# Patient Record
Sex: MALE | Race: WHITE | Employment: FULL TIME | URBAN - METROPOLITAN AREA
[De-identification: names, ages, dates, MRNs, and addresses within clinical notes are randomized per-mention and may not be internally consistent; named-entity substitution may affect disease eponyms.]

---

## 2017-12-05 ENCOUNTER — OFFICE VISIT (OUTPATIENT)
Dept: FAMILY MEDICINE CLINIC | Age: 57
End: 2017-12-05

## 2017-12-05 VITALS
OXYGEN SATURATION: 97 % | BODY MASS INDEX: 23.78 KG/M2 | SYSTOLIC BLOOD PRESSURE: 128 MMHG | WEIGHT: 189 LBS | HEART RATE: 52 BPM | TEMPERATURE: 98.5 F | DIASTOLIC BLOOD PRESSURE: 82 MMHG

## 2017-12-05 DIAGNOSIS — N52.9 ERECTILE DYSFUNCTION, UNSPECIFIED ERECTILE DYSFUNCTION TYPE: Primary | ICD-10-CM

## 2017-12-05 PROCEDURE — 99213 OFFICE O/P EST LOW 20 MIN: CPT | Performed by: NURSE PRACTITIONER

## 2017-12-05 RX ORDER — SILDENAFIL 100 MG/1
100 TABLET, FILM COATED ORAL PRN
Qty: 6 TABLET | Refills: 3 | Status: SHIPPED | OUTPATIENT
Start: 2017-12-05 | End: 2018-05-29 | Stop reason: SDUPTHER

## 2017-12-05 ASSESSMENT — ENCOUNTER SYMPTOMS: SHORTNESS OF BREATH: 0

## 2017-12-05 NOTE — PROGRESS NOTES
Patient: Krystle Gutierrez is a 56 y.o. male who presents today with the following Chief Complaint(s):  Chief Complaint   Patient presents with    Erectile Dysfunction         HPI   Patient is a 57 yo male who is here to discuss ED. Has been off lexapro and xanax for awhile. Takes benedryl at night to help with sleep. Recently started having ED. Went through stressful divorce in September. Started seeing a new woman and has tried to have sex a couple times and could not get an erection. He feels like the chemistry is there with this woman. Never had a problem with getting an erection with his ex-wife although he states they only had sex a couple times a year. Current Outpatient Prescriptions   Medication Sig Dispense Refill    DiphenhydrAMINE HCl (BENADRYL PO) Take 25 mg by mouth Take 1 -1.5 tabs at night      sildenafil (VIAGRA) 100 MG tablet Take 1 tablet by mouth as needed for Erectile Dysfunction 6 tablet 3     No current facility-administered medications for this visit. Patient's past medical history, surgical history, family history, medications,  and allergies  were all reviewed and updated as appropriate today. Review of Systems   Respiratory: Negative for shortness of breath. Cardiovascular: Negative for chest pain and palpitations. Genitourinary: Negative for dysuria, frequency and urgency. ED   Psychiatric/Behavioral: Negative for substance abuse. The patient has insomnia (benedryl helps). Physical Exam   Constitutional: He is oriented to person, place, and time. He appears well-developed and well-nourished. HENT:   Head: Normocephalic and atraumatic. Eyes: Conjunctivae are normal.   Cardiovascular: Normal rate, regular rhythm and normal heart sounds. No murmur heard. Pulmonary/Chest: Effort normal and breath sounds normal. No respiratory distress. He has no wheezes. He has no rales. Neurological: He is alert and oriented to person, place, and time. Skin: Skin is warm and dry. Psychiatric: He has a normal mood and affect. His behavior is normal. Judgment and thought content normal.   Nursing note and vitals reviewed. Vitals:    12/05/17 0816   BP: 128/82   Pulse: 52   Temp: 98.5 °F (36.9 °C)   SpO2: 97%       Assessment:  Encounter Diagnosis   Name Primary?  Erectile dysfunction, unspecified erectile dysfunction type Yes       Plan:  1. Erectile dysfunction, unspecified erectile dysfunction type    - sildenafil (VIAGRA) 100 MG tablet; Take 1 tablet by mouth as needed for Erectile Dysfunction  Dispense: 6 tablet;  Refill: 3  Told patient to take 1/2 tablet to start with

## 2017-12-06 ENCOUNTER — TELEPHONE (OUTPATIENT)
Dept: FAMILY MEDICINE CLINIC | Age: 57
End: 2017-12-06

## 2018-05-29 ENCOUNTER — OFFICE VISIT (OUTPATIENT)
Dept: FAMILY MEDICINE CLINIC | Age: 58
End: 2018-05-29

## 2018-05-29 VITALS
SYSTOLIC BLOOD PRESSURE: 122 MMHG | WEIGHT: 189 LBS | HEART RATE: 67 BPM | DIASTOLIC BLOOD PRESSURE: 76 MMHG | OXYGEN SATURATION: 97 % | TEMPERATURE: 98.1 F | BODY MASS INDEX: 23.78 KG/M2

## 2018-05-29 DIAGNOSIS — N52.9 ERECTILE DYSFUNCTION, UNSPECIFIED ERECTILE DYSFUNCTION TYPE: ICD-10-CM

## 2018-05-29 PROCEDURE — 99213 OFFICE O/P EST LOW 20 MIN: CPT | Performed by: NURSE PRACTITIONER

## 2018-05-29 RX ORDER — SILDENAFIL 100 MG/1
100 TABLET, FILM COATED ORAL PRN
Qty: 12 TABLET | Refills: 3 | Status: SHIPPED | OUTPATIENT
Start: 2018-05-29 | End: 2020-02-11 | Stop reason: SDUPTHER

## 2018-05-29 ASSESSMENT — ENCOUNTER SYMPTOMS: SHORTNESS OF BREATH: 0

## 2018-06-11 ENCOUNTER — TELEPHONE (OUTPATIENT)
Dept: FAMILY MEDICINE CLINIC | Age: 58
End: 2018-06-11

## 2018-06-11 DIAGNOSIS — Z00.00 PHYSICAL EXAM: Primary | ICD-10-CM

## 2018-06-14 ENCOUNTER — NURSE ONLY (OUTPATIENT)
Dept: FAMILY MEDICINE CLINIC | Age: 58
End: 2018-06-14

## 2018-06-14 DIAGNOSIS — Z00.00 PHYSICAL EXAM: Primary | ICD-10-CM

## 2018-06-14 DIAGNOSIS — Z00.00 PHYSICAL EXAM: ICD-10-CM

## 2018-06-14 LAB
A/G RATIO: 1.6 (ref 1.1–2.2)
ALBUMIN SERPL-MCNC: 4.5 G/DL (ref 3.4–5)
ALP BLD-CCNC: 55 U/L (ref 40–129)
ALT SERPL-CCNC: 15 U/L (ref 10–40)
ANION GAP SERPL CALCULATED.3IONS-SCNC: 13 MMOL/L (ref 3–16)
AST SERPL-CCNC: 18 U/L (ref 15–37)
BASOPHILS ABSOLUTE: 0 K/UL (ref 0–0.2)
BASOPHILS RELATIVE PERCENT: 0.8 %
BILIRUB SERPL-MCNC: 0.8 MG/DL (ref 0–1)
BUN BLDV-MCNC: 30 MG/DL (ref 7–20)
CALCIUM SERPL-MCNC: 9.4 MG/DL (ref 8.3–10.6)
CHLORIDE BLD-SCNC: 102 MMOL/L (ref 99–110)
CHOLESTEROL, TOTAL: 200 MG/DL (ref 0–199)
CO2: 27 MMOL/L (ref 21–32)
CREAT SERPL-MCNC: 0.8 MG/DL (ref 0.9–1.3)
EOSINOPHILS ABSOLUTE: 0.1 K/UL (ref 0–0.6)
EOSINOPHILS RELATIVE PERCENT: 1.4 %
GFR AFRICAN AMERICAN: >60
GFR NON-AFRICAN AMERICAN: >60
GLOBULIN: 2.8 G/DL
GLUCOSE BLD-MCNC: 100 MG/DL (ref 70–99)
HCT VFR BLD CALC: 42.6 % (ref 40.5–52.5)
HDLC SERPL-MCNC: 49 MG/DL (ref 40–60)
HEMOGLOBIN: 14.6 G/DL (ref 13.5–17.5)
LDL CHOLESTEROL CALCULATED: 125 MG/DL
LYMPHOCYTES ABSOLUTE: 1.4 K/UL (ref 1–5.1)
LYMPHOCYTES RELATIVE PERCENT: 31.5 %
MCH RBC QN AUTO: 32.3 PG (ref 26–34)
MCHC RBC AUTO-ENTMCNC: 34.3 G/DL (ref 31–36)
MCV RBC AUTO: 94 FL (ref 80–100)
MONOCYTES ABSOLUTE: 0.5 K/UL (ref 0–1.3)
MONOCYTES RELATIVE PERCENT: 10.7 %
NEUTROPHILS ABSOLUTE: 2.4 K/UL (ref 1.7–7.7)
NEUTROPHILS RELATIVE PERCENT: 55.6 %
PDW BLD-RTO: 13.8 % (ref 12.4–15.4)
PLATELET # BLD: 152 K/UL (ref 135–450)
PMV BLD AUTO: 9.4 FL (ref 5–10.5)
POTASSIUM SERPL-SCNC: 4.2 MMOL/L (ref 3.5–5.1)
PROSTATE SPECIFIC ANTIGEN: 2.29 NG/ML (ref 0–4)
RBC # BLD: 4.53 M/UL (ref 4.2–5.9)
SODIUM BLD-SCNC: 142 MMOL/L (ref 136–145)
TOTAL PROTEIN: 7.3 G/DL (ref 6.4–8.2)
TRIGL SERPL-MCNC: 130 MG/DL (ref 0–150)
TSH REFLEX FT4: 2.7 UIU/ML (ref 0.27–4.2)
VLDLC SERPL CALC-MCNC: 26 MG/DL
WBC # BLD: 4.4 K/UL (ref 4–11)

## 2018-06-14 PROCEDURE — 36415 COLL VENOUS BLD VENIPUNCTURE: CPT | Performed by: NURSE PRACTITIONER

## 2018-06-29 ENCOUNTER — TELEPHONE (OUTPATIENT)
Dept: FAMILY MEDICINE CLINIC | Age: 58
End: 2018-06-29

## 2018-06-29 RX ORDER — INDOMETHACIN 50 MG/1
50 CAPSULE ORAL 3 TIMES DAILY
Qty: 21 CAPSULE | Refills: 0 | Status: SHIPPED | OUTPATIENT
Start: 2018-06-29 | End: 2018-07-09 | Stop reason: DRUGHIGH

## 2018-07-09 ENCOUNTER — OFFICE VISIT (OUTPATIENT)
Dept: FAMILY MEDICINE CLINIC | Age: 58
End: 2018-07-09

## 2018-07-09 VITALS
SYSTOLIC BLOOD PRESSURE: 125 MMHG | HEIGHT: 72 IN | WEIGHT: 191.6 LBS | OXYGEN SATURATION: 98 % | BODY MASS INDEX: 25.95 KG/M2 | HEART RATE: 60 BPM | DIASTOLIC BLOOD PRESSURE: 82 MMHG

## 2018-07-09 DIAGNOSIS — Z00.00 PHYSICAL EXAM: Primary | ICD-10-CM

## 2018-07-09 PROCEDURE — 99396 PREV VISIT EST AGE 40-64: CPT | Performed by: NURSE PRACTITIONER

## 2018-07-09 RX ORDER — INDOMETHACIN 50 MG/1
50 CAPSULE ORAL 3 TIMES DAILY
Qty: 21 CAPSULE | Refills: 5 | Status: SHIPPED | OUTPATIENT
Start: 2018-07-09 | End: 2019-05-20 | Stop reason: SDUPTHER

## 2018-07-09 ASSESSMENT — PATIENT HEALTH QUESTIONNAIRE - PHQ9
SUM OF ALL RESPONSES TO PHQ QUESTIONS 1-9: 2
1. LITTLE INTEREST OR PLEASURE IN DOING THINGS: 1
SUM OF ALL RESPONSES TO PHQ9 QUESTIONS 1 & 2: 2
2. FEELING DOWN, DEPRESSED OR HOPELESS: 1

## 2018-07-09 ASSESSMENT — ENCOUNTER SYMPTOMS
DIARRHEA: 1
CONSTIPATION: 1
HEARTBURN: 0
SORE THROAT: 0
EYE REDNESS: 0
EYE PAIN: 0
BLOOD IN STOOL: 0
SHORTNESS OF BREATH: 0
BACK PAIN: 0
ABDOMINAL PAIN: 0
WHEEZING: 0

## 2018-07-09 NOTE — PROGRESS NOTES
cervical adenopathy. Neurological: He is alert and oriented to person, place, and time. He has normal reflexes. Skin: Skin is warm and dry. Psychiatric: He has a normal mood and affect. His behavior is normal. Judgment and thought content normal.   Nursing note and vitals reviewed. Assessment:  Encounter Diagnosis   Name Primary?  Physical exam Yes       Plan:  1.  Physical exam  Labs were all normal

## 2018-07-17 ENCOUNTER — OFFICE VISIT (OUTPATIENT)
Dept: DERMATOLOGY | Age: 58
End: 2018-07-17

## 2018-07-17 DIAGNOSIS — D18.00 ANGIOMA: ICD-10-CM

## 2018-07-17 DIAGNOSIS — D22.5 NEVUS OF GROIN: Primary | ICD-10-CM

## 2018-07-17 DIAGNOSIS — D23.62 DERMATOFIBROMA OF LEFT UPPER ARM: ICD-10-CM

## 2018-07-17 PROCEDURE — 99202 OFFICE O/P NEW SF 15 MIN: CPT | Performed by: DERMATOLOGY

## 2018-07-17 NOTE — PROGRESS NOTES
[facility]  MD [address]  1960    66 y.o. male     Date of Visit: 7/17/2018    Chief Complaint: skin lesion    History of Present Illness: \"Carlos\"    1. He presents today for a painless skin lesion in the groin. It has been present for years. He reports that it has enlarged some over time. 2.  He also reports a stable lesion on the left forearm. 3.  He also complains of a persistent red lesion on the left side of the nose. Review of Systems:  Skin: No other rash or concerning skin lesions. Past Medical History, Family History, Surgical History, Medications and Allergies reviewed. Past Medical History:   Diagnosis Date    Anxiety     siuational    Testicular mass     History of: Testicular US normal     Past Surgical History:   Procedure Laterality Date    CARDIOVASCULAR STRESS TEST  12/24/2015    Stress Echo    COLONOSCOPY  10/12    no specimens    MOUTH SURGERY      VASECTOMY  1997    VASECTOMY  1997       No Known Allergies  Outpatient Prescriptions Marked as Taking for the 7/17/18 encounter (Office Visit) with Coy Diallo MD   Medication Sig Dispense Refill    indomethacin (INDOCIN) 50 MG capsule Take 1 capsule by mouth 3 times daily 21 capsule 5    sildenafil (VIAGRA) 100 MG tablet Take 1 tablet by mouth as needed for Erectile Dysfunction (Patient taking differently: Take 50 mg by mouth as needed for Erectile Dysfunction ) 12 tablet 3    DiphenhydrAMINE HCl (BENADRYL PO) Take 25 mg by mouth Take 1 -1.5 tabs at night         Social History:  Occupation:   - defends doctors in medical malpractice cases. Physical Examination       The following were examined and determined to be normal: Psych/Neuro, Head/face, Conjunctivae/eyelids, Gums/teeth/lips, Neck, Breast/axilla/chest, Abdomen, Back, RUE and LUE. The following were examined and determined to be abnormal: None.      Well

## 2019-05-20 DIAGNOSIS — B00.1 COLD SORE: Primary | ICD-10-CM

## 2019-05-20 RX ORDER — VALACYCLOVIR HYDROCHLORIDE 1 G/1
2000 TABLET, FILM COATED ORAL 2 TIMES DAILY
Qty: 4 TABLET | Refills: 5 | Status: SHIPPED | OUTPATIENT
Start: 2019-05-20 | End: 2020-02-11 | Stop reason: SDUPTHER

## 2019-05-20 RX ORDER — INDOMETHACIN 50 MG/1
50 CAPSULE ORAL 3 TIMES DAILY
Qty: 21 CAPSULE | Refills: 5 | Status: SHIPPED | OUTPATIENT
Start: 2019-05-20 | End: 2020-09-10 | Stop reason: SDUPTHER

## 2020-02-11 ENCOUNTER — PATIENT MESSAGE (OUTPATIENT)
Dept: FAMILY MEDICINE CLINIC | Age: 60
End: 2020-02-11

## 2020-02-11 RX ORDER — SILDENAFIL 100 MG/1
100 TABLET, FILM COATED ORAL PRN
Qty: 12 TABLET | Refills: 3 | Status: SHIPPED | OUTPATIENT
Start: 2020-02-11

## 2020-02-11 RX ORDER — VALACYCLOVIR HYDROCHLORIDE 1 G/1
1000 TABLET, FILM COATED ORAL DAILY
Qty: 30 TABLET | Refills: 5 | Status: SHIPPED | OUTPATIENT
Start: 2020-02-11 | End: 2020-03-12

## 2020-02-11 NOTE — TELEPHONE ENCOUNTER
From: Evette Malhotra  To: Paula Maier APRN - CNP  Sent: 2020 1:29 PM EST  Subject: Prescription Question    Jeanette Josue. Hope you are well. My Viagra px has . Could you submit a new prescription? Also, I am in the process of filling my last Valtrex px. Is there any chance I can get that px refilled as well for a larger quantity? I get cold sores from time to time, mostly due to stress, and like to have some on hand to take the minute I think I am getting an outbreak. You can submit my prescriptions to the [facility]. Any questions, you can e-mail me at [email or web address] or call/text me at [phone]. Thanks so much.  Alfonso Majano

## 2020-09-08 ENCOUNTER — OFFICE VISIT (OUTPATIENT)
Dept: PRIMARY CARE CLINIC | Age: 60
End: 2020-09-08
Payer: COMMERCIAL

## 2020-09-08 ENCOUNTER — NURSE TRIAGE (OUTPATIENT)
Dept: OTHER | Facility: CLINIC | Age: 60
End: 2020-09-08

## 2020-09-08 ENCOUNTER — TELEMEDICINE (OUTPATIENT)
Dept: FAMILY MEDICINE CLINIC | Age: 60
End: 2020-09-08
Payer: COMMERCIAL

## 2020-09-08 LAB — SARS-COV-2, NAA: NOT DETECTED

## 2020-09-08 PROCEDURE — 99211 OFF/OP EST MAY X REQ PHY/QHP: CPT | Performed by: NURSE PRACTITIONER

## 2020-09-08 PROCEDURE — 99213 OFFICE O/P EST LOW 20 MIN: CPT | Performed by: NURSE PRACTITIONER

## 2020-09-08 RX ORDER — VALACYCLOVIR HYDROCHLORIDE 1 G/1
2000 TABLET, FILM COATED ORAL 2 TIMES DAILY
Qty: 4 TABLET | Refills: 5 | Status: SHIPPED | OUTPATIENT
Start: 2020-09-08 | End: 2022-04-26 | Stop reason: SDUPTHER

## 2020-09-08 ASSESSMENT — ENCOUNTER SYMPTOMS
BLOOD IN STOOL: 0
RECTAL PAIN: 0
SHORTNESS OF BREATH: 0
DIARRHEA: 1
WHEEZING: 0

## 2020-09-08 NOTE — PROGRESS NOTES
Agatha Mahoney received a viral test for COVID-19. They were educated on isolation and quarantine as appropriate. For any symptoms, they were directed to seek care from their PCP, given contact information to establish with a doctor, directed to an urgent care or the emergency room.

## 2020-09-08 NOTE — TELEPHONE ENCOUNTER
Diarrhea and abdominal pain for a week. Onset sudden. Also has body aches, chills at night. Denies fevers, SOB, cough. Reason for Disposition   MODERATE diarrhea (e.g., 4-6 times / day more than normal) and present > 48 hours (2 days)    Answer Assessment - Initial Assessment Questions  1. DIARRHEA SEVERITY: \"How bad is the diarrhea? \" \"How many extra stools have you had in the past 24 hours than normal?\"     - NO DIARRHEA (SCALE 0)    - MILD (SCALE 1-3): Few loose or mushy BMs; increase of 1-3 stools over normal daily number of stools; mild increase in ostomy output. -  MODERATE (SCALE 4-7): Increase of 4-6 stools daily over normal; moderate increase in ostomy output. * SEVERE (SCALE 8-10; OR 'WORST POSSIBLE'): Increase of 7 or more stools daily over normal; moderate increase in ostomy output; incontinence. 4-6 a day for the past week. 2. ONSET: \"When did the diarrhea begin? \"       See above    3. BM CONSISTENCY: \"How loose or watery is the diarrhea? \"       Watery in beginning. Now more loose. 4. VOMITING: \"Are you also vomiting? \" If so, ask: \"How many times in the past 24 hours? \"       Denies    5. ABDOMINAL PAIN: Crystal Kelsie you having any abdominal pain? \" If yes: \"What does it feel like? \" (e.g., crampy, dull, intermittent, constant)       Comes and goes. Center of abdomen. Pain feels like an achy and subsides after diarrhea    6. ABDOMINAL PAIN SEVERITY: If present, ask: \"How bad is the pain? \"  (e.g., Scale 1-10; mild, moderate, or severe)    - MILD (1-3): doesn't interfere with normal activities, abdomen soft and not tender to touch     - MODERATE (4-7): interferes with normal activities or awakens from sleep, tender to touch     - SEVERE (8-10): excruciating pain, doubled over, unable to do any normal activities        None at time of call    7. ORAL INTAKE: If vomiting, \"Have you been able to drink liquids? \" \"How much fluids have you had in the past 24 hours? \"      Yes    8.  HYDRATION: Moi Lerma signs of dehydration? \" (e.g., dry mouth [not just dry lips], too weak to stand, dizziness, new weight loss) \"When did you last urinate? \"      Slight dry mouth. Denies dizziness    9. EXPOSURE: \"Have you traveled to a foreign country recently? \" \"Have you been exposed to anyone with diarrhea? \" \"Could you have eaten any food that was spoiled? \"      Denies    10. ANTIBIOTIC USE: \"Are you taking antibiotics now or have you taken antibiotics in the past 2 months? \"        Denies    11. OTHER SYMPTOMS: \"Do you have any other symptoms? \" (e.g., fever, blood in stool)        Denies    12. PREGNANCY: \"Is there any chance you are pregnant? \" \"When was your last menstrual period? \"        NA    Protocols used: DIARRHEA-ADULT-[place]    Patient called pre-service center [facility]) to schedule appointment, with red flag complaint, transferred to RN access for triage. See above questions and answers. Caller talking full sentences without any distress on phone. Discussed disposition and patient agreeable. Discussed potential consequences for not following disposition recommendation. Aware to call back with any concerns or persistent, worsening, or new symptoms develop. Warm transfer to [facility] scheduling for appointment. He has an appt to get tested for COVID at 10am today. Please do not respond to the triage nurse through this encounter. Any subsequent communication should be directly with the patient.

## 2020-09-08 NOTE — PROGRESS NOTES
2020    TELEHEALTH EVALUATION -- Audio/Visual (During CNABV-48 public health emergency)    HPI:    Jillian Wilson (:  1960) has requested an audio/video evaluation for the following concern(s):    Diarrhea: a week ago- last Tuesday after lunch-cramping in stomach- thought it was something he ate- body aches that night- chills that night- since then has had diarrhea until this morning was not as watery- no fever, no more chills, no chest pain or SOB. Watching diet- has been eating oatmeal and rice, 7-up. No blood in stool-  Watery diarrhea- got tested for COVID this morning-     97 F at home     Review of Systems   Constitutional: Negative for chills and fever. Respiratory: Negative for shortness of breath and wheezing. Cardiovascular: Negative for chest pain and palpitations. Gastrointestinal: Positive for diarrhea. Negative for blood in stool and rectal pain. Prior to Visit Medications    Medication Sig Taking?  Authorizing Provider   valACYclovir (VALTREX) 1 g tablet Take 2 tablets by mouth 2 times daily for 1 day Yes LIN Ramey CNP   sildenafil (VIAGRA) 100 MG tablet Take 1 tablet by mouth as needed for Erectile Dysfunction  LIN Ramey CNP   indomethacin (INDOCIN) 50 MG capsule Take 1 capsule by mouth 3 times daily  LIN Ramey CNP   DiphenhydrAMINE HCl (BENADRYL PO) Take 25 mg by mouth Take 1 -1.5 tabs at night  Historical Provider, MD       Social History     Tobacco Use    Smoking status: Never Smoker    Smokeless tobacco: Never Used   Substance Use Topics    Alcohol use: Yes     Comment: social / weekend wine    Drug use: No        No Known Allergies,   Past Medical History:   Diagnosis Date    Anxiety     siuational    Testicular mass     History of: Testicular US normal   ,   Past Surgical History:   Procedure Laterality Date    CARDIOVASCULAR STRESS TEST  2015    Stress Echo    COLONOSCOPY  10/12    no specimens    MOUTH Saira 21   ,   Family History   Problem Relation Age of Onset    Mental Illness Mother         agoraphobia    Other Mother         anxiety, agoraphobia    Heart Disease Neg Hx        PHYSICAL EXAMINATION:  [ INSTRUCTIONS:  \"[x]\" Indicates a positive item  \"[]\" Indicates a negative item  -- DELETE ALL ITEMS NOT EXAMINED]  Vital Signs: (As obtained by patient/caregiver or practitioner observation)    Blood pressure-  Heart rate-    Respiratory rate-    Temperature-  97F Pulse oximetry-     Constitutional: [x] Appears well-developed and well-nourished [x] No apparent distress      [] Abnormal-   Mental status  [x] Alert and awake  [x] Oriented to person/place/time [x]Able to follow commands      Eyes:  EOM    []  Normal  [] Abnormal-  Sclera  [x]  Normal  [] Abnormal -         Discharge [x]  None visible  [] Abnormal -    HENT:   [x] Normocephalic, atraumatic. [] Abnormal   [x] Mouth/Throat: Mucous membranes are moist.     External Ears [x] Normal  [] Abnormal-     Neck: [x] No visualized mass     Pulmonary/Chest: [x] Respiratory effort normal.  [x] No visualized signs of difficulty breathing or respiratory distress        [] Abnormal-      Musculoskeletal:   [] Normal gait with no signs of ataxia         [x] Normal range of motion of neck        [] Abnormal-       Neurological:        [x] No Facial Asymmetry (Cranial nerve 7 motor function) (limited exam to video visit)          [x] No gaze palsy        [] Abnormal-         Skin:        [x] No significant exanthematous lesions or discoloration noted on facial skin         [] Abnormal-            Psychiatric:       [x] Normal Affect [x] No Hallucinations        [] Abnormal-     Other pertinent observable physical exam findings-     ASSESSMENT/PLAN:  1. Cold sore    - valACYclovir (VALTREX) 1 g tablet; Take 2 tablets by mouth 2 times daily for 1 day  Dispense: 4 tablet; Refill: 5    2.  Diarrhea, unspecified type  Try imodium,

## 2020-09-10 RX ORDER — INDOMETHACIN 50 MG/1
50 CAPSULE ORAL 3 TIMES DAILY
Qty: 21 CAPSULE | Refills: 5 | Status: SHIPPED | OUTPATIENT
Start: 2020-09-10 | End: 2020-09-10 | Stop reason: SDUPTHER

## 2020-09-10 RX ORDER — INDOMETHACIN 50 MG/1
50 CAPSULE ORAL 3 TIMES DAILY
Qty: 21 CAPSULE | Refills: 5 | Status: SHIPPED | OUTPATIENT
Start: 2020-09-10

## 2021-04-13 ENCOUNTER — PATIENT MESSAGE (OUTPATIENT)
Dept: FAMILY MEDICINE CLINIC | Age: 61
End: 2021-04-13

## 2021-04-13 ENCOUNTER — OFFICE VISIT (OUTPATIENT)
Dept: FAMILY MEDICINE CLINIC | Age: 61
End: 2021-04-13
Payer: COMMERCIAL

## 2021-04-13 VITALS
DIASTOLIC BLOOD PRESSURE: 76 MMHG | TEMPERATURE: 98.1 F | OXYGEN SATURATION: 96 % | BODY MASS INDEX: 25.53 KG/M2 | HEIGHT: 73 IN | HEART RATE: 62 BPM | WEIGHT: 192.6 LBS | SYSTOLIC BLOOD PRESSURE: 118 MMHG

## 2021-04-13 DIAGNOSIS — R00.2 INTERMITTENT PALPITATIONS: Primary | ICD-10-CM

## 2021-04-13 DIAGNOSIS — F41.9 ANXIETY: ICD-10-CM

## 2021-04-13 PROCEDURE — 36415 COLL VENOUS BLD VENIPUNCTURE: CPT | Performed by: NURSE PRACTITIONER

## 2021-04-13 PROCEDURE — 99213 OFFICE O/P EST LOW 20 MIN: CPT | Performed by: NURSE PRACTITIONER

## 2021-04-13 PROCEDURE — 93000 ELECTROCARDIOGRAM COMPLETE: CPT | Performed by: NURSE PRACTITIONER

## 2021-04-13 ASSESSMENT — PATIENT HEALTH QUESTIONNAIRE - PHQ9
8. MOVING OR SPEAKING SO SLOWLY THAT OTHER PEOPLE COULD HAVE NOTICED. OR THE OPPOSITE, BEING SO FIGETY OR RESTLESS THAT YOU HAVE BEEN MOVING AROUND A LOT MORE THAN USUAL: 0
SUM OF ALL RESPONSES TO PHQ QUESTIONS 1-9: 2
SUM OF ALL RESPONSES TO PHQ9 QUESTIONS 1 & 2: 2
SUM OF ALL RESPONSES TO PHQ QUESTIONS 1-9: 2
1. LITTLE INTEREST OR PLEASURE IN DOING THINGS: 0
9. THOUGHTS THAT YOU WOULD BE BETTER OFF DEAD, OR OF HURTING YOURSELF: 0
4. FEELING TIRED OR HAVING LITTLE ENERGY: 0
7. TROUBLE CONCENTRATING ON THINGS, SUCH AS READING THE NEWSPAPER OR WATCHING TELEVISION: 0
3. TROUBLE FALLING OR STAYING ASLEEP: 0

## 2021-04-13 ASSESSMENT — ENCOUNTER SYMPTOMS
RESPIRATORY NEGATIVE: 1
GASTROINTESTINAL NEGATIVE: 1

## 2021-04-13 NOTE — PROGRESS NOTES
Psychiatric/Behavioral: Negative for self-injury and suicidal ideas. The patient is nervous/anxious. Physical Exam  Vitals signs reviewed. Cardiovascular:      Rate and Rhythm: Normal rate and regular rhythm. Heart sounds: Normal heart sounds. Pulmonary:      Effort: Pulmonary effort is normal.      Breath sounds: Normal breath sounds. Skin:     General: Skin is warm and dry. Neurological:      Mental Status: He is alert and oriented to person, place, and time.    Psychiatric:         Mood and Affect: Mood normal.         Behavior: Behavior normal.       Vitals:    04/13/21 1614   BP: 118/76   Pulse: 62   Temp: 98.1 °F (36.7 °C)   SpO2: 96%

## 2021-04-14 LAB
A/G RATIO: 2.2 (ref 1.1–2.2)
ALBUMIN SERPL-MCNC: 4.8 G/DL (ref 3.4–5)
ALP BLD-CCNC: 47 U/L (ref 40–129)
ALT SERPL-CCNC: 15 U/L (ref 10–40)
ANION GAP SERPL CALCULATED.3IONS-SCNC: 9 MMOL/L (ref 3–16)
AST SERPL-CCNC: 17 U/L (ref 15–37)
BILIRUB SERPL-MCNC: 1.2 MG/DL (ref 0–1)
BUN BLDV-MCNC: 15 MG/DL (ref 7–20)
CALCIUM SERPL-MCNC: 9.2 MG/DL (ref 8.3–10.6)
CHLORIDE BLD-SCNC: 99 MMOL/L (ref 99–110)
CO2: 28 MMOL/L (ref 21–32)
CREAT SERPL-MCNC: 0.7 MG/DL (ref 0.9–1.3)
GFR AFRICAN AMERICAN: >60
GFR NON-AFRICAN AMERICAN: >60
GLOBULIN: 2.2 G/DL
GLUCOSE BLD-MCNC: 78 MG/DL (ref 70–99)
MAGNESIUM: 2.1 MG/DL (ref 1.8–2.4)
POTASSIUM SERPL-SCNC: 4 MMOL/L (ref 3.5–5.1)
SODIUM BLD-SCNC: 136 MMOL/L (ref 136–145)
TOTAL PROTEIN: 7 G/DL (ref 6.4–8.2)
TROPONIN: <0.01 NG/ML
TSH REFLEX FT4: 3.2 UIU/ML (ref 0.27–4.2)

## 2021-04-14 RX ORDER — ESCITALOPRAM OXALATE 10 MG/1
10 TABLET ORAL DAILY
Qty: 30 TABLET | Refills: 3 | Status: ON HOLD | OUTPATIENT
Start: 2021-04-14 | End: 2021-09-16

## 2021-04-16 ENCOUNTER — TELEPHONE (OUTPATIENT)
Dept: FAMILY MEDICINE CLINIC | Age: 61
End: 2021-04-16

## 2021-04-16 ENCOUNTER — NURSE TRIAGE (OUTPATIENT)
Dept: OTHER | Facility: CLINIC | Age: 61
End: 2021-04-16

## 2021-04-16 RX ORDER — HYDROXYZINE HYDROCHLORIDE 25 MG/1
25 TABLET, FILM COATED ORAL EVERY 8 HOURS PRN
Qty: 30 TABLET | Refills: 0 | Status: SHIPPED | OUTPATIENT
Start: 2021-04-16 | End: 2021-05-16

## 2021-04-16 NOTE — TELEPHONE ENCOUNTER
Pt is having bad panic attacks depression,given lexapro recently,pt 's wife thinks he needs a few xanax until the lexapro takes effect,he is staking ,depressed no appt avail wants to see a doctor not an NP ,Vel Bee also had no appt avail please advised

## 2021-04-16 NOTE — TELEPHONE ENCOUNTER
Please call patient and find out what is needed. I sent in hydroxyzine to help with anxiety and panic attacks.

## 2021-04-16 NOTE — TELEPHONE ENCOUNTER
Received call from Catherine Lozano at pre-service center [facility]) [place] with Red Flag Complaint. Brief description of triage: Pt states panic attack since last night. Pt states he just started Lexapro and has taken 2 doses. Pt states it has not helped. Pt denies SI/HI. Pt is working from home with wife in home today. Pt states he has been under a lot of stress with work recently and feels this is the cause of his stress. Triage indicates for patient to be seen by PCP today    Care advice provided, patient verbalizes understanding; denies any other questions or concerns; instructed to call back for any new or worsening symptoms. Writer provided warm transfer to Devan Fontana at [facility] for appointment scheduling. Attention Provider: Thank you for allowing me to participate in the care of your patient. The patient was connected to triage in response to information provided to the E[facility]. Please do not respond through this encounter as the response is not directed to a shared pool. Reason for Disposition   Patient sounds very upset or troubled to the triager    Answer Assessment - Initial Assessment Questions  1. CONCERN: \"What happened that made you call today? \"      Wife states pt is not himself right now and is having a panic attack    2. ANXIETY SYMPTOM SCREENING: \"Can you describe how you have been feeling? \"  (e.g., tense, restless, panicky, anxious, keyed up, trouble sleeping, trouble concentrating)      Wife states coming out of skin, panicky and difficulty sleeping    3. ONSET: \"How long have you been feeling this way? \"      Wife states since last night    4. RECURRENT: \"Have you felt this way before? \"  If yes: \"What happened that time? \" \"What helped these feelings go away in the past?\"       Yes    5. RISK OF HARM - SUICIDAL IDEATION:  \"Do you ever have thoughts of hurting or killing yourself? \"  (e.g., yes, no, no but preoccupation with thoughts about death)    - INTENT:  \"Do you have thoughts of hurting or killing yourself right NOW? \" (e.g., yes, no, N/A)    - PLAN: \"Do you have a specific plan for how you would do this? \" (e.g., gun, knife, overdose, no plan, N/A)      Wife states he denies    6. RISK OF HARM - HOMICIDAL IDEATION:  \"Do you ever have thoughts of hurting or killing someone else? \"  (e.g., yes, no, no but preoccupation with thoughts about death)    - INTENT:  \"Do you have thoughts of hurting or killing someone right NOW? \" (e.g., yes, no, N/A)    - PLAN: \"Do you have a specific plan for how you would do this? \" (e.g., gun, knife, no plan, N/A)       Wife states he denies    7. FUNCTIONAL IMPAIRMENT: \"How have things been going for you overall in your life? Have you had any more difficulties than usual doing your normal daily activities? \"  (e.g., better, same, worse; self-care, school, work, interactions)      Wife states worse than normal, more severe panic attack. Pt states work stress is ramped up, he is working from home today. 8. SUPPORT: \"Who is with you now? \" \"Who do you live with?\" \"Do you have family or friends nearby who you can talk to?\"       Wife is with him now. Wife on phone    9. THERAPIST: \"Do you have a counselor or therapist? Name? \"      Online therapist    10. STRESSORS: \"Has there been any new stress or recent changes in your life? \"        Wife states yes-work is very stressful, pt is     6. CAFFEINE ABUSE: \"Do you drink caffeinated beverages, and how much each day? \" (e.g., coffee, tea, kayli)        Denies    12. SUBSTANCE ABUSE: \"Do you use any illegal drugs or alcohol?\"        1.5 glasses of wine    13. OTHER SYMPTOMS: \"Do you have any other physical symptoms right now? \" (e.g., chest pain, palpitations, difficulty breathing, fever)        Denies- States chest pain a few days ago, but was checked and was fine    14. PREGNANCY: \"Is there any chance you are pregnant? \" \"When was your last menstrual period? \"        N/A    Protocols used: ANXIETY AND PANIC

## 2021-09-10 ENCOUNTER — TELEPHONE (OUTPATIENT)
Dept: FAMILY MEDICINE CLINIC | Age: 61
End: 2021-09-10

## 2021-09-10 NOTE — TELEPHONE ENCOUNTER
----- Message from Estephania Joelle sent at 9/10/2021  7:07 AM EDT -----  Subject: Appointment Request    Reason for Call: Urgent Cough Cold    QUESTIONS  Type of Appointment? Established Patient  Reason for appointment request? No appointments available during search  Additional Information for Provider? Patient has tested positive for COVID   as of 9/9 from a home kit; symptoms started Wednesday ( 9/8 ) after   exposure. Having some chills, stuffy nose and sore throat at this point   and looking to get a virtual visit to get treatment options. Wanting to   know if could get the IV treatment.  ---------------------------------------------------------------------------  --------------  CALL BACK INFO  What is the best way for the office to contact you? OK to leave message on   voicemail  Preferred Call Back Phone Number? [phone]  ---------------------------------------------------------------------------  --------------  SCRIPT ANSWERS  Relationship to Patient? Self  Are you currently unable to finish sentences due to any difficulty   breathing? No  Are you unable to swallow liquids? No  Are you having fevers (100.4 or greater), chills, or sweats? Yes   Have you been diagnosed with, awaiting test results for, or told that you   are suspected of having COVID-19 (Coronavirus)? (If patient has tested   negative or was tested as a requirement for work, school, or travel and   not based on symptoms, answer no)? Yes  Did your symptoms begin within the past 14 days or was your positive test   result within the past 14 days?  Yes

## 2021-09-10 NOTE — TELEPHONE ENCOUNTER
Pt informed per EG. Pt also asked if the infusion was an option? Per EG the treatment has not been effective and is only used with severe symptoms. Pt verbalized understanding.

## 2021-09-10 NOTE — TELEPHONE ENCOUNTER
Pt contacted and he was informed that NB is out of the office today and there was no opening for today. He was advised to go to [facility] or ED. He then asked if there was things he could do at home due to his sx's not being severe? He is also asking if the Infusion could be ordered for him to help. Could you advise or should this wait for NB?

## 2021-09-10 NOTE — TELEPHONE ENCOUNTER
We just recommend over-the-counter Mucinex, Tylenol or ibuprofen as needed for fever or body aches. He should also be taking a daily multivitamin zinc and vitamin D daily.

## 2021-09-16 ENCOUNTER — APPOINTMENT (OUTPATIENT)
Dept: GENERAL RADIOLOGY | Age: 61
DRG: 177 | End: 2021-09-16
Payer: COMMERCIAL

## 2021-09-16 ENCOUNTER — HOSPITAL ENCOUNTER (INPATIENT)
Age: 61
LOS: 1 days | Discharge: HOME | DRG: 177 | End: 2021-09-17
Attending: EMERGENCY MEDICINE | Admitting: INTERNAL MEDICINE
Payer: COMMERCIAL

## 2021-09-16 ENCOUNTER — APPOINTMENT (OUTPATIENT)
Dept: CT IMAGING | Age: 61
DRG: 177 | End: 2021-09-16
Payer: COMMERCIAL

## 2021-09-16 DIAGNOSIS — J96.01 ACUTE RESPIRATORY FAILURE WITH HYPOXIA (HCC): ICD-10-CM

## 2021-09-16 DIAGNOSIS — J12.82 PNEUMONIA DUE TO COVID-19 VIRUS: Primary | ICD-10-CM

## 2021-09-16 DIAGNOSIS — U07.1 PNEUMONIA DUE TO COVID-19 VIRUS: Primary | ICD-10-CM

## 2021-09-16 PROBLEM — J96.00 ACUTE RESPIRATORY FAILURE (HCC): Status: ACTIVE | Noted: 2021-09-16

## 2021-09-16 LAB
A/G RATIO: 0.9 (ref 1.1–2.2)
ALBUMIN SERPL-MCNC: 3.5 G/DL (ref 3.4–5)
ALP BLD-CCNC: 76 U/L (ref 40–129)
ALT SERPL-CCNC: 49 U/L (ref 10–40)
ANION GAP SERPL CALCULATED.3IONS-SCNC: 11 MMOL/L (ref 3–16)
AST SERPL-CCNC: 58 U/L (ref 15–37)
BASOPHILS ABSOLUTE: 0.1 K/UL (ref 0–0.2)
BASOPHILS RELATIVE PERCENT: 1.5 %
BILIRUB SERPL-MCNC: 0.4 MG/DL (ref 0–1)
BUN BLDV-MCNC: 17 MG/DL (ref 7–20)
CALCIUM SERPL-MCNC: 8.8 MG/DL (ref 8.3–10.6)
CHLORIDE BLD-SCNC: 101 MMOL/L (ref 99–110)
CO2: 24 MMOL/L (ref 21–32)
CREAT SERPL-MCNC: 1 MG/DL (ref 0.9–1.3)
D DIMER: 633 NG/ML DDU (ref 0–229)
EKG ATRIAL RATE: 90 BPM
EKG DIAGNOSIS: NORMAL
EKG P AXIS: 56 DEGREES
EKG P-R INTERVAL: 142 MS
EKG Q-T INTERVAL: 354 MS
EKG QRS DURATION: 92 MS
EKG QTC CALCULATION (BAZETT): 433 MS
EKG R AXIS: -7 DEGREES
EKG T AXIS: 12 DEGREES
EKG VENTRICULAR RATE: 90 BPM
EOSINOPHILS ABSOLUTE: 0 K/UL (ref 0–0.6)
EOSINOPHILS RELATIVE PERCENT: 0 %
GFR AFRICAN AMERICAN: >60
GFR NON-AFRICAN AMERICAN: >60
GLOBULIN: 3.7 G/DL
GLUCOSE BLD-MCNC: 161 MG/DL (ref 70–99)
HCT VFR BLD CALC: 35.5 % (ref 40.5–52.5)
HEMOGLOBIN: 12.5 G/DL (ref 13.5–17.5)
LYMPHOCYTES ABSOLUTE: 0.3 K/UL (ref 1–5.1)
LYMPHOCYTES RELATIVE PERCENT: 4.3 %
MCH RBC QN AUTO: 32 PG (ref 26–34)
MCHC RBC AUTO-ENTMCNC: 35.2 G/DL (ref 31–36)
MCV RBC AUTO: 90.9 FL (ref 80–100)
MONOCYTES ABSOLUTE: 0.4 K/UL (ref 0–1.3)
MONOCYTES RELATIVE PERCENT: 4.9 %
NEUTROPHILS ABSOLUTE: 7.1 K/UL (ref 1.7–7.7)
NEUTROPHILS RELATIVE PERCENT: 89.3 %
PDW BLD-RTO: 13.1 % (ref 12.4–15.4)
PLATELET # BLD: 116 K/UL (ref 135–450)
PMV BLD AUTO: 8.5 FL (ref 5–10.5)
POTASSIUM REFLEX MAGNESIUM: 3.9 MMOL/L (ref 3.5–5.1)
PROCALCITONIN: 0.34 NG/ML (ref 0–0.15)
RBC # BLD: 3.9 M/UL (ref 4.2–5.9)
SARS-COV-2, NAAT: DETECTED
SODIUM BLD-SCNC: 136 MMOL/L (ref 136–145)
TOTAL PROTEIN: 7.2 G/DL (ref 6.4–8.2)
TROPONIN: <0.01 NG/ML
WBC # BLD: 7.9 K/UL (ref 4–11)

## 2021-09-16 PROCEDURE — 2580000003 HC RX 258: Performed by: INTERNAL MEDICINE

## 2021-09-16 PROCEDURE — 87635 SARS-COV-2 COVID-19 AMP PRB: CPT

## 2021-09-16 PROCEDURE — 85025 COMPLETE CBC W/AUTO DIFF WBC: CPT

## 2021-09-16 PROCEDURE — 6370000000 HC RX 637 (ALT 250 FOR IP): Performed by: INTERNAL MEDICINE

## 2021-09-16 PROCEDURE — 93005 ELECTROCARDIOGRAM TRACING: CPT | Performed by: EMERGENCY MEDICINE

## 2021-09-16 PROCEDURE — 36415 COLL VENOUS BLD VENIPUNCTURE: CPT

## 2021-09-16 PROCEDURE — 99284 EMERGENCY DEPT VISIT MOD MDM: CPT

## 2021-09-16 PROCEDURE — 71045 X-RAY EXAM CHEST 1 VIEW: CPT

## 2021-09-16 PROCEDURE — 85379 FIBRIN DEGRADATION QUANT: CPT

## 2021-09-16 PROCEDURE — 6360000002 HC RX W HCPCS: Performed by: INTERNAL MEDICINE

## 2021-09-16 PROCEDURE — 80053 COMPREHEN METABOLIC PANEL: CPT

## 2021-09-16 PROCEDURE — 1200000000 HC SEMI PRIVATE

## 2021-09-16 PROCEDURE — 96374 THER/PROPH/DIAG INJ IV PUSH: CPT

## 2021-09-16 PROCEDURE — 84484 ASSAY OF TROPONIN QUANT: CPT

## 2021-09-16 PROCEDURE — 6360000004 HC RX CONTRAST MEDICATION: Performed by: EMERGENCY MEDICINE

## 2021-09-16 PROCEDURE — 6360000002 HC RX W HCPCS: Performed by: EMERGENCY MEDICINE

## 2021-09-16 PROCEDURE — 71260 CT THORAX DX C+: CPT

## 2021-09-16 PROCEDURE — 6370000000 HC RX 637 (ALT 250 FOR IP): Performed by: EMERGENCY MEDICINE

## 2021-09-16 PROCEDURE — 93010 ELECTROCARDIOGRAM REPORT: CPT | Performed by: INTERNAL MEDICINE

## 2021-09-16 PROCEDURE — 84145 PROCALCITONIN (PCT): CPT

## 2021-09-16 PROCEDURE — 2580000003 HC RX 258: Performed by: EMERGENCY MEDICINE

## 2021-09-16 RX ORDER — SODIUM CHLORIDE 9 MG/ML
25 INJECTION, SOLUTION INTRAVENOUS PRN
Status: DISCONTINUED | OUTPATIENT
Start: 2021-09-16 | End: 2021-09-17 | Stop reason: HOSPADM

## 2021-09-16 RX ORDER — ACETAMINOPHEN 650 MG/1
650 SUPPOSITORY RECTAL EVERY 6 HOURS PRN
Status: DISCONTINUED | OUTPATIENT
Start: 2021-09-16 | End: 2021-09-17 | Stop reason: HOSPADM

## 2021-09-16 RX ORDER — 0.9 % SODIUM CHLORIDE 0.9 %
1000 INTRAVENOUS SOLUTION INTRAVENOUS ONCE
Status: COMPLETED | OUTPATIENT
Start: 2021-09-16 | End: 2021-09-16

## 2021-09-16 RX ORDER — MIRTAZAPINE 15 MG/1
15 TABLET, FILM COATED ORAL NIGHTLY
COMMUNITY

## 2021-09-16 RX ORDER — MIRTAZAPINE 15 MG/1
15 TABLET, FILM COATED ORAL NIGHTLY
Status: DISCONTINUED | OUTPATIENT
Start: 2021-09-16 | End: 2021-09-17 | Stop reason: HOSPADM

## 2021-09-16 RX ORDER — ACETAMINOPHEN 325 MG/1
650 TABLET ORAL EVERY 6 HOURS PRN
Status: DISCONTINUED | OUTPATIENT
Start: 2021-09-16 | End: 2021-09-17 | Stop reason: HOSPADM

## 2021-09-16 RX ORDER — POLYETHYLENE GLYCOL 3350 17 G/17G
17 POWDER, FOR SOLUTION ORAL DAILY PRN
Status: DISCONTINUED | OUTPATIENT
Start: 2021-09-16 | End: 2021-09-17 | Stop reason: HOSPADM

## 2021-09-16 RX ORDER — ONDANSETRON 4 MG/1
4 TABLET, ORALLY DISINTEGRATING ORAL EVERY 8 HOURS PRN
Status: DISCONTINUED | OUTPATIENT
Start: 2021-09-16 | End: 2021-09-17 | Stop reason: HOSPADM

## 2021-09-16 RX ORDER — ACETAMINOPHEN 500 MG
1000 TABLET ORAL ONCE
Status: COMPLETED | OUTPATIENT
Start: 2021-09-16 | End: 2021-09-16

## 2021-09-16 RX ORDER — METHYLPREDNISOLONE SODIUM SUCCINATE 40 MG/ML
40 INJECTION, POWDER, LYOPHILIZED, FOR SOLUTION INTRAMUSCULAR; INTRAVENOUS EVERY 12 HOURS
Status: DISCONTINUED | OUTPATIENT
Start: 2021-09-16 | End: 2021-09-17

## 2021-09-16 RX ORDER — DEXAMETHASONE SODIUM PHOSPHATE 10 MG/ML
10 INJECTION INTRAMUSCULAR; INTRAVENOUS ONCE
Status: COMPLETED | OUTPATIENT
Start: 2021-09-16 | End: 2021-09-16

## 2021-09-16 RX ORDER — ONDANSETRON 2 MG/ML
4 INJECTION INTRAMUSCULAR; INTRAVENOUS EVERY 6 HOURS PRN
Status: DISCONTINUED | OUTPATIENT
Start: 2021-09-16 | End: 2021-09-17 | Stop reason: HOSPADM

## 2021-09-16 RX ORDER — CLONAZEPAM 0.5 MG/1
0.5 TABLET ORAL 2 TIMES DAILY PRN
COMMUNITY

## 2021-09-16 RX ORDER — SODIUM CHLORIDE 0.9 % (FLUSH) 0.9 %
5-40 SYRINGE (ML) INJECTION EVERY 12 HOURS SCHEDULED
Status: DISCONTINUED | OUTPATIENT
Start: 2021-09-16 | End: 2021-09-17 | Stop reason: HOSPADM

## 2021-09-16 RX ORDER — SODIUM CHLORIDE 0.9 % (FLUSH) 0.9 %
5-40 SYRINGE (ML) INJECTION PRN
Status: DISCONTINUED | OUTPATIENT
Start: 2021-09-16 | End: 2021-09-17 | Stop reason: HOSPADM

## 2021-09-16 RX ORDER — GUAIFENESIN/DEXTROMETHORPHAN 100-10MG/5
5 SYRUP ORAL EVERY 4 HOURS PRN
Status: DISCONTINUED | OUTPATIENT
Start: 2021-09-16 | End: 2021-09-17 | Stop reason: HOSPADM

## 2021-09-16 RX ORDER — ACYCLOVIR 200 MG/1
CAPSULE ORAL PRN
COMMUNITY

## 2021-09-16 RX ADMIN — METHYLPREDNISOLONE SODIUM SUCCINATE 40 MG: 40 INJECTION, POWDER, FOR SOLUTION INTRAMUSCULAR; INTRAVENOUS at 17:12

## 2021-09-16 RX ADMIN — MIRTAZAPINE 15 MG: 15 TABLET, FILM COATED ORAL at 22:21

## 2021-09-16 RX ADMIN — SODIUM CHLORIDE 1000 ML: 9 INJECTION, SOLUTION INTRAVENOUS at 08:34

## 2021-09-16 RX ADMIN — IOPAMIDOL 75 ML: 755 INJECTION, SOLUTION INTRAVENOUS at 09:32

## 2021-09-16 RX ADMIN — ACETAMINOPHEN 1000 MG: 500 TABLET ORAL at 08:35

## 2021-09-16 RX ADMIN — ENOXAPARIN SODIUM 30 MG: 30 INJECTION SUBCUTANEOUS at 22:21

## 2021-09-16 RX ADMIN — Medication 10 ML: at 22:22

## 2021-09-16 RX ADMIN — GUAIFENESIN AND DEXTROMETHORPHAN 5 ML: 100; 10 SYRUP ORAL at 17:12

## 2021-09-16 RX ADMIN — DEXAMETHASONE SODIUM PHOSPHATE 10 MG: 10 INJECTION INTRAMUSCULAR; INTRAVENOUS at 08:34

## 2021-09-16 ASSESSMENT — PAIN SCALES - GENERAL: PAINLEVEL_OUTOF10: 0

## 2021-09-16 NOTE — ED PROVIDER NOTES
[facility]      CHIEF COMPLAINT  Positive For Covid-19 (pt dx with COVID last Thursday via home kit. pt called 911 due to worsening symptoms. they report initial room air sat 85%)      HISTORY OF PRESENT ILLNESS  Ethyl Murray is a 63 y.o. male who is otherwise healthy presents with shortness of breath, body aches and headaches in the setting of a COVID-19 infection. He states he has had 1 Pfizer COVID-19 vaccine but 8 days later his wife got Covid and he was exposed. He states his wife had Covid last week and was actually hospitalized for several days at [facility].  He states then last Thursday he began to develop symptoms. He took at home rapid Covid test and it was positive. He has mostly had body aches and headaches. He has been checking his oxygen levels at home and they have usually been in the mid 90s. Last night he was feeling quite ill and while walking to the kitchen he states he collapsed. He did not hit his head but just lowered himself to the ground. He actually called the squad last night but then decided he did not want to come to the ER. That happened again this morning when he got out of bed to go to the bathroom. He states he just does not have any energy and collapsed. He did not hit his head today either. He states he does not have any injuries from the falls and was able to lower himself to the ground. He states this morning he just felt like he could not stand he was so weak. When EMS arrived his oxygen saturation was noted to be 85% on room air. He has not had a cough or chest pain. No other complaints, modifying factors or associated symptoms. I have reviewed the following from the nursing documentation.     Past Medical History:   Diagnosis Date    Anxiety     siuational    COVID-19     Testicular mass     History of: Testicular US normal     Past Surgical History:   Procedure Laterality Date    CARDIOVASCULAR STRESS TEST  12/24/2015    Stress Echo    COLONOSCOPY  10/12    no specimens    MOUTH SURGERY      VASECTOMY  1997    VASECTOMY  1997     Family History   Problem Relation Age of Onset    Mental Illness Mother         agoraphobia    Other Mother         anxiety, agoraphobia    Heart Disease Neg Hx      Social History     Socioeconomic History    Marital status:      Spouse name: Not on file    Number of children: Not on file    Years of education: Not on file    Highest education level: Not on file   Occupational History    Not on file   Tobacco Use    Smoking status: Never Smoker    Smokeless tobacco: Never Used   Vaping Use    Vaping Use: Never used   Substance and Sexual Activity    Alcohol use: Yes     Comment: social / weekend wine    Drug use: No    Sexual activity: Yes     Partners: Female   Other Topics Concern    Not on file   Social History Narrative    Not on file     Social Determinants of Health     Financial Resource Strain:     Difficulty of Paying Living Expenses:    Food Insecurity:     Worried About Running Out of Food in the Last Year:     Ran Out of Food in the Last Year:    Transportation Needs:     Lack of Transportation (Medical):      Lack of Transportation (Non-Medical):    Physical Activity:     Days of Exercise per Week:     Minutes of Exercise per Session:    Stress:     Feeling of Stress :    Social Connections:     Frequency of Communication with Friends and Family:     Frequency of Social Gatherings with Friends and Family:     Attends Restorationism Services:     Active Member of Clubs or Organizations:     Attends Club or Organization Meetings:     Marital Status:    Intimate Partner Violence:     Fear of Current or Ex-Partner:     Emotionally Abused:     Physically Abused:     Sexually Abused:      Current Facility-Administered Medications   Medication Dose Route Frequency Provider Last Rate Last Admin    0.9 % sodium chloride bolus  1,000 mL IntraVENous Once Gayle Nevarez MD 1,000 mL/hr at 09/16/21 0834 1,000 mL at 09/16/21 0834    iopamidol (ISOVUE-370) 76 % injection 75 mL  75 mL IntraVENous ONCE PRN Gyale Nevarez MD         Current Outpatient Medications   Medication Sig Dispense Refill    escitalopram (LEXAPRO) 10 MG tablet Take 1 tablet by mouth daily 30 tablet 3    indomethacin (INDOCIN) 50 MG capsule Take 1 capsule by mouth 3 times daily 21 capsule 5    sildenafil (VIAGRA) 100 MG tablet Take 1 tablet by mouth as needed for Erectile Dysfunction 12 tablet 3    DiphenhydrAMINE HCl (BENADRYL PO) Take 25 mg by mouth Take 1 -1.5 tabs at night       No Known Allergies    REVIEW OF SYSTEMS  10 systems reviewed, pertinent positives per HPI otherwise noted to be negative. PHYSICAL EXAM  /79   Pulse 99   Temp 99 °F (37.2 °C) (Oral)   Resp 24   Ht 6' 1\" (1.854 m)   Wt 200 lb (90.7 kg)   SpO2 (!) 89% Comment: placed on 2 liters NC and sats increased to 92%  BMI 26.39 kg/m²   GENERAL APPEARANCE: Awake and alert. Cooperative. No acute distress. HEAD: Normocephalic. Atraumatic. EYES: PERRL. EOM's grossly intact. ENT: Mucous membranes are moist.   NECK: Supple, trachea midline. HEART: RRR. LUNGS: Respirations unlabored. Rhonchi noted at bilateral bases. ABDOMEN: Soft. Non-distended. Non-tender. No guarding or rebound. EXTREMITIES: No peripheral edema. MAEE. No acute deformities. SKIN: Warm, dry and intact. No acute rashes. NEUROLOGICAL: Alert and oriented X 3. CN II-XII grossly intact. Strength 5/5, sensation intact. Normal coordination. PSYCHIATRIC: Normal mood and affect. LABS  I have reviewed all labs for this visit.    Results for orders placed or performed during the hospital encounter of 09/16/21   COVID-19, Rapid    Specimen: Nasopharyngeal Swab   Result Value Ref Range    SARS-CoV-2, NAAT DETECTED (AA) Not Detected   CBC Auto Differential   Result Value Ref Range    WBC 7.9 4.0 - 11.0 K/uL    RBC 3.90 (L) 4.20 - 5.90 M/uL    Hemoglobin 12.5 (L) 13.5 - 17.5 g/dL    Hematocrit 35.5 (L) 40.5 - 52.5 %    MCV 90.9 80.0 - 100.0 fL    MCH 32.0 26.0 - 34.0 pg    MCHC 35.2 31.0 - 36.0 g/dL    RDW 13.1 12.4 - 15.4 %    Platelets 792 (L) 846 - 450 K/uL    MPV 8.5 5.0 - 10.5 fL    Neutrophils % 89.3 %    Lymphocytes % 4.3 %    Monocytes % 4.9 %    Eosinophils % 0.0 %    Basophils % 1.5 %    Neutrophils Absolute 7.1 1.7 - 7.7 K/uL    Lymphocytes Absolute 0.3 (L) 1.0 - 5.1 K/uL    Monocytes Absolute 0.4 0.0 - 1.3 K/uL    Eosinophils Absolute 0.0 0.0 - 0.6 K/uL    Basophils Absolute 0.1 0.0 - 0.2 K/uL   Comprehensive Metabolic Panel w/ Reflex to MG   Result Value Ref Range    Sodium 136 136 - 145 mmol/L    Potassium reflex Magnesium 3.9 3.5 - 5.1 mmol/L    Chloride 101 99 - 110 mmol/L    CO2 24 21 - 32 mmol/L    Anion Gap 11 3 - 16    Glucose 161 (H) 70 - 99 mg/dL    BUN 17 7 - 20 mg/dL    CREATININE 1.0 0.9 - 1.3 mg/dL    GFR Non-African American >60 >60    GFR African American >60 >60    Calcium 8.8 8.3 - 10.6 mg/dL    Total Protein 7.2 6.4 - 8.2 g/dL    Albumin 3.5 3.4 - 5.0 g/dL    Albumin/Globulin Ratio 0.9 (L) 1.1 - 2.2    Total Bilirubin 0.4 0.0 - 1.0 mg/dL    Alkaline Phosphatase 76 40 - 129 U/L    ALT 49 (H) 10 - 40 U/L    AST 58 (H) 15 - 37 U/L    Globulin 3.7 g/dL   D-Dimer, Quantitative   Result Value Ref Range    D-Dimer, Quant 633 (H) 0 - 229 ng/mL DDU   Troponin   Result Value Ref Range    Troponin <0.01 <0.01 ng/mL   EKG 12 Lead   Result Value Ref Range    Ventricular Rate 90 BPM    Atrial Rate 90 BPM    P-R Interval 142 ms    QRS Duration 92 ms    Q-T Interval 354 ms    QTc Calculation (Bazett) 433 ms    P Axis 56 degrees    R Axis -7 degrees    T Axis 12 degrees    Diagnosis       Sinus rhythm with Premature atrial complexesPossible Left atrial enlargementNonspecific ST abnormalityNo previous ECGs availableConfirmed by CHARITY CEJA MD ([id number]) on 9/16/2021 9:21:24 AM       EKG  The Ekg interpreted by myself  normal sinus rhythm with a rate of 90  Axis is   Normal  QTc is  normal  Intervals and Durations are unremarkable. No specific ST-T wave changes appreciated. No evidence of acute ischemia. Sinus rhythm with premature atrial complexes has replaced sinus bradycardia when compared to the EKG from April 13, 2021. Cardiac Monitoring: The cardiac monitor revealed normal sinus rhythm as interpreted by me. The cardiac monitor was ordered secondary to the patient's complaint of shortness of breath and to monitor the patient for dysrhythmia. RADIOLOGY  X-RAYS:  I have reviewed radiologic plain film image(s). ALL OTHER NON-PLAIN FILM IMAGES SUCH AS CT, ULTRASOUND AND MRI HAVE BEEN READ BY THE RADIOLOGIST. XR CHEST PORTABLE   Final Result   Findings are consistent with multifocal pneumonia, to include atypical viral   causes. CT CHEST PULMONARY EMBOLISM W CONTRAST    (Results Pending)              Rechecks: Physical assessment performed. Patient is stable on 2 L nasal cannula oxygen. He is feeling better after some IV fluids and Tylenol here. Critical Care:I personally spent a total of 40 minutes of critical care time in obtaining history, performing a physical exam, bedside monitoring of interventions, collecting and interpreting tests and discussion with consultants but excluding time spent performing procedures, treating other patients and teaching time. ED COURSE/MDM  Patient seen and evaluated. Here the patient is afebrile with hypoxia to 89% on room air. He is responding to 2 L of supplemental oxygen. Old records reviewed. He has bibasilar rhonchi on exam and looks like he does not feel well. I have ordered IV fluids, Tylenol and IV Decadron. I will repeat a Covid test here since only Covid test he has was a rapid test at home. Chest x-ray is pending.   Lab work including D-dimer has been ordered secondary to his hypoxia. I do anticipate admission but I will reassess. Covid test is confirmed positive here. D-dimer is also elevated so a CT PA has been ordered. Chest x-ray shows bilateral patchy groundglass opacities consistent with Covid pneumonia. The remainder of his lab work is normal.  He has been given IV steroids and IV fluids. I will admit him to the hospitalist and have discussed the patient with Dr. Dakota Haddad. Labs and imaging reviewed and results discussed with patient. Patient was reassessed as noted above . Plan of care discussed with patient. Patient in agreement with plan. CLINICAL IMPRESSION  1. Pneumonia due to COVID-19 virus    2. Acute respiratory failure with hypoxia (HCC)        Blood pressure 135/79, pulse 99, temperature 99 °F (37.2 °C), temperature source Oral, resp. rate 24, height 6' 1\" (1.854 m), weight 200 lb (90.7 kg), SpO2 (!) 89 %. DISPOSITION  Jonah Bravo was admitted in stable condition.     (Please note this note was completed with a voice recognition program.  Efforts were made to edit the dictations but occasionally words are mis-transcribed.)       Juli Marcelo MD  09/16/21 5334

## 2021-09-16 NOTE — H&P
Hospital Medicine History & Physical      PCP: LIN Kirby - CNP    Date of Admission: 9/16/2021    Date of Service: Pt seen/examined on 9/16/21 and Admitted to Inpatient with expected LOS greater than two midnights due to medical therapy. Chief Complaint:  sob      History Of Present Illness:       63 y.o. male who presented to [facility] with sob. Pt recalls developing URI symptoms on 9/8 with stuffy nose. He did home covid test which was initially negative but repeat came back positive. Of late, he felt worse with fevers(101.5) and took tylenol/ibuprofen for relief. Yesterday, he noted malaise, arthralgias/myalgias. Last night arouns 12-1am, he went downstairs to the kitchen to refill ice pack but passed out on the kitchen floor. His wife heard him fall and called EMS(who checked him out and was wnl). He went back to bed and woke up around 7am today and wife helped him to toilet and on way back to bedroom, again fell to floor due to weakness. EMS again came and brought him in(noted sats around 85%). Pt denies ever hitting his head.  -Of note wife had covid 1 week before and spent 3 days at [facility] hospital  -per ER note, he did get 1 dose of Pfizer vaccine but shortly after was exposed to covid from his wife. ER course: given ivfs, steroids, tylenol    Past Medical History:          Diagnosis Date    Anxiety     siuational    COVID-19     Testicular mass     History of: Testicular US normal       Past Surgical History:          Procedure Laterality Date    CARDIOVASCULAR STRESS TEST  12/24/2015    Stress Echo    COLONOSCOPY  10/12    no specimens    MOUTH SURGERY      VASECTOMY  1997    VASECTOMY  1997       Medications Prior to Admission:      Prior to Admission medications    Medication Sig Start Date End Date Taking?  Authorizing Provider   mirtazapine (REMERON) 15 MG tablet Take 15 mg by mouth nightly   Yes Historical Provider, MD   clonazePAM (KLONOPIN) 0.5 MG tablet Take 0.5 mg by mouth 2 times daily as needed. Yes Historical Provider, MD   acyclovir (ZOVIRAX) 200 MG capsule Take by mouth as needed   Yes Historical Provider, MD   indomethacin (INDOCIN) 50 MG capsule Take 1 capsule by mouth 3 times daily 9/10/20  Yes Gillermina Councilman, APRN - CNP   sildenafil (VIAGRA) 100 MG tablet Take 1 tablet by mouth as needed for Erectile Dysfunction 2/11/20  Yes Gillermina Councilman, APRN - CNP   DiphenhydrAMINE HCl (BENADRYL PO) Take 25 mg by mouth Take 1 -1.5 tabs at night   Yes Historical Provider, MD       Allergies:  Patient has no known allergies. Social History:      The patient currently lives at home    TOBACCO:   reports that he has never smoked. He has never used smokeless tobacco.  ETOH:   reports current alcohol use. E-Cigarettes/Vaping Use     Questions Responses    E-Cigarette/Vaping Use Never User    Start Date     Passive Exposure     Quit Date     Counseling Given     Comments             Family History:       Reviewed in detail and negative for DM, CAD, Cancer, CVA. Positive as follows:        Problem Relation Age of Onset    Mental Illness Mother         agoraphobia    Other Mother         anxiety, agoraphobia    Heart Disease Neg Hx        REVIEW OF SYSTEMS COMPLETED:   Pertinent positives as noted in the HPI. All other systems reviewed and negative. PHYSICAL EXAM PERFORMED:    BP (!) 142/83   Pulse 86   Temp 97.6 °F (36.4 °C) (Oral)   Resp 18   Ht 6' 1\" (1.854 m)   Wt 199 lb 15.3 oz (90.7 kg)   SpO2 92%   BMI 26.38 kg/m²     General appearance:  No apparent distress, appears stated age and cooperative. HEENT:  Normal cephalic, atraumatic without obvious deformity. Pupils equal, round, and reactive to light. Extra ocular muscles intact. Conjunctivae/corneas clear. Neck: Supple, with full range of motion. No jugular venous distention. Trachea midline. Respiratory:  inc'd  respiratory effort. bilaterally without Wheezes/Rhonchi.  Mild scattered rales  Cardiovascular:  Regular rate and rhythm with normal S1/S2 without murmurs, rubs or gallops. Abdomen: Soft, non-tender, non-distended with normal bowel sounds. Musculoskeletal:  No clubbing, cyanosis or edema bilaterally. Full range of motion without deformity. Skin: Skin color, texture, turgor normal.  No rashes or lesions. Neurologic:  Neurovascularly intact without any focal sensory/motor deficits. Cranial nerves: II-XII intact, grossly non-focal.  Psychiatric:  Alert and oriented, thought content appropriate, normal insight  Capillary Refill: Brisk,3 seconds, normal  Peripheral Pulses: +2 palpable, equal bilaterally       Labs:     Recent Labs     09/16/21  0754   WBC 7.9   HGB 12.5*   HCT 35.5*   *     Recent Labs     09/16/21  0754      K 3.9      CO2 24   BUN 17   CREATININE 1.0   CALCIUM 8.8     Recent Labs     09/16/21  0754   AST 58*   ALT 49*   BILITOT 0.4   ALKPHOS 76     No results for input(s): INR in the last 72 hours. Recent Labs     09/16/21  0754   TROPONINI <0.01       Urinalysis:    No results found for: Dawson Cram, BACTERIA, RBCUA, BLOODU, [address], GLUCOSEU    Radiology:       EKG:  I have reviewed the EKG with the following interpretation: sinus, 90, nl axis, left axis, PACs, nonspecific st changes    CT CHEST PULMONARY EMBOLISM W CONTRAST   Final Result   1. Negative for pulmonary embolus. 2. Multifocal bilateral atypical pneumonia. XR CHEST PORTABLE   Final Result   Findings are consistent with multifocal pneumonia, to include atypical viral   causes.              ASSESSMENT:    Active Hospital Problems    Diagnosis Date Noted    Acute respiratory failure ([facility] [address] [J96.00] 09/16/2021         PLAN:    Acute resp failure-due to covid  -Tele  -supp ox  -IV solumedrol  -Check fibrinogen/procal/crp/ldh/ferritin/ddimer/25 oh vit d  -CTPA ordered and neg for PE  -added iv levaquin  9/16 given abn procal    Gout- on prn indomethacin    transaminitis- due to above  -trended out    Anxiety/sleep- on mirtazapine    DVT Prophylaxis: lovenox bid  Diet: ADULT DIET; Regular  Code Status: Full Code    PT/OT Eval Status: not ordered    Dispo - pending improvement, 1-2 days       Hermes Rod MD    Thank you LIN Louie - JORDIN for the opportunity to be involved in this patient's care. If you have any questions or concerns please feel free to contact me at [phone].

## 2021-09-16 NOTE — PROGRESS NOTES
Pt admitted to [facility] in stable condition on RA. Pt oriented to room, call light, POC. Pt provided with menu. Lungs clear throughout with exception of rhonchi in RUL. Pt states he has a dry cough. Pt states he is mildly weak. Pt denies n/v/d and poor appetite. Admission and assessment complete and charted. Pt stable and denied needs when writer left room.

## 2021-09-16 NOTE — ED NOTES
Bed: 20  Expected date:   Expected time:   Means of arrival: [facility]  Comments[facility]  [facility] [address] [facility]  09/16/21 7026

## 2021-09-16 NOTE — ED NOTES
Pt positive COVID him and his wife did home kit on Thursday and state that he has had increase resp distress since Thursday. Pt came in by squad after calling 911 upon arrival squad state that pt sats 85% and pt was placed on 2 lit NC. Pt also c/o body aches and HA. Pt had the 1st shot of Benson Peter however never got the 2nd shot d/t testing positive for COVID a week later. Pt is awake alert and above 90% on RA. Pt state that yesterday at home while walking around in the home he collapsed to the floor. Pt kelvin hitting head. Pt kelvin any injury. Pt had called the squad last night however decided that he didn't need to come to the ER. Pt state that this AM when he awaken and went to the bathroom that he again had a spell where he collapsed. Pt again denied hitting head and denied any injury. No other complaints only SOB. Pt lungs CTA with some decrease in the bases bilaterally. Pt color is pale. Pt did get dinner tray ordered to room and with good PO. Pt awaiting inpatient room assignment. Pt has not had any spells/collapse since ER visit.       Pete Michelle, RN  09/16/21 3277

## 2021-09-17 VITALS
SYSTOLIC BLOOD PRESSURE: 116 MMHG | RESPIRATION RATE: 18 BRPM | DIASTOLIC BLOOD PRESSURE: 74 MMHG | HEART RATE: 67 BPM | TEMPERATURE: 97.7 F | OXYGEN SATURATION: 93 % | WEIGHT: 199.96 LBS | BODY MASS INDEX: 26.5 KG/M2 | HEIGHT: 73 IN

## 2021-09-17 LAB
C-REACTIVE PROTEIN: 162.9 MG/L (ref 0–5.1)
D DIMER: 680 NG/ML DDU (ref 0–229)
FERRITIN: 1390 NG/ML (ref 30–400)
FIBRINOGEN: 929 MG/DL (ref 200–397)
INR BLD: 1.06 (ref 0.88–1.12)
LACTATE DEHYDROGENASE: 266 U/L (ref 100–190)
PROTHROMBIN TIME: 12 SEC (ref 9.9–12.7)
VITAMIN D 25-HYDROXY: 12.3 NG/ML

## 2021-09-17 PROCEDURE — 82306 VITAMIN D 25 HYDROXY: CPT

## 2021-09-17 PROCEDURE — 85384 FIBRINOGEN ACTIVITY: CPT

## 2021-09-17 PROCEDURE — 6360000002 HC RX W HCPCS: Performed by: INTERNAL MEDICINE

## 2021-09-17 PROCEDURE — 85610 PROTHROMBIN TIME: CPT

## 2021-09-17 PROCEDURE — 83615 LACTATE (LD) (LDH) ENZYME: CPT

## 2021-09-17 PROCEDURE — 2580000003 HC RX 258: Performed by: INTERNAL MEDICINE

## 2021-09-17 PROCEDURE — 85379 FIBRIN DEGRADATION QUANT: CPT

## 2021-09-17 PROCEDURE — 6370000000 HC RX 637 (ALT 250 FOR IP): Performed by: INTERNAL MEDICINE

## 2021-09-17 PROCEDURE — 82728 ASSAY OF FERRITIN: CPT

## 2021-09-17 PROCEDURE — 36415 COLL VENOUS BLD VENIPUNCTURE: CPT

## 2021-09-17 PROCEDURE — 86140 C-REACTIVE PROTEIN: CPT

## 2021-09-17 RX ORDER — DEXAMETHASONE 6 MG/1
6 TABLET ORAL DAILY
Qty: 8 TABLET | Refills: 0 | Status: SHIPPED | OUTPATIENT
Start: 2021-09-18 | End: 2021-09-26

## 2021-09-17 RX ORDER — DEXAMETHASONE 4 MG/1
6 TABLET ORAL DAILY
Status: DISCONTINUED | OUTPATIENT
Start: 2021-09-18 | End: 2021-09-17 | Stop reason: HOSPADM

## 2021-09-17 RX ADMIN — GUAIFENESIN AND DEXTROMETHORPHAN 5 ML: 100; 10 SYRUP ORAL at 02:13

## 2021-09-17 RX ADMIN — ENOXAPARIN SODIUM 30 MG: 30 INJECTION SUBCUTANEOUS at 08:25

## 2021-09-17 RX ADMIN — ACETAMINOPHEN 650 MG: 325 TABLET ORAL at 06:36

## 2021-09-17 RX ADMIN — Medication 10 ML: at 08:26

## 2021-09-17 RX ADMIN — METHYLPREDNISOLONE SODIUM SUCCINATE 40 MG: 40 INJECTION, POWDER, FOR SOLUTION INTRAMUSCULAR; INTRAVENOUS at 06:35

## 2021-09-17 ASSESSMENT — PAIN DESCRIPTION - PAIN TYPE: TYPE: ACUTE PAIN

## 2021-09-17 ASSESSMENT — PAIN SCALES - GENERAL
PAINLEVEL_OUTOF10: 2
PAINLEVEL_OUTOF10: 3

## 2021-09-17 ASSESSMENT — PAIN DESCRIPTION - LOCATION: LOCATION: HEAD

## 2021-09-17 NOTE — PROGRESS NOTES
Patient in bed awake. A/O x 4. Assessment completed and charted. Respirations even and unlabored. Lungs CTA. Complaining of headache  at this time. Ambulating without difficulty. No complaints of dizziness. Bed in lowest position and locked. Call light in reach.

## 2021-09-17 NOTE — CARE COORDINATION
CASE MANAGEMENT INITIAL ASSESSMENT    Reviewed chart and completed assessment with: Patient   Explained Case Management role/services. Primary contact information:Chriss - ex wife     Health Care Decision Maker :   Primary Decision Maker: [address] - [phone]    Secondary Decision Maker: Flory Bustillos Solomon Other - [phone]    Supplemental (Other) Decision Maker: Dustin Arias - Parent - [phone]        Can this person be reached and be able to respond quickly, such as within a few minutes or hours? Yes      Admit date/status:09/16/2021 Inpatient   Diagnosis:Acute respiratory failure   Is this a Readmission?:  No      Insurance:[employer]   Precert required for SNF: No       3 night stay required: No    Living arrangements, Adls, care needs, prior to admission:Home with ex wife reconciled, 2 steps to enter, 2nd floor bedroom     Transportation:Active will have     Durable Medical Equipment at home:  [address] [facility] [address] in the home and/or outpatient, prior to [facility] Notification (HEN):NA    Barriers to discharge:Deneis, past walk test    Plan/comments:Tara is from home, IPTA on room air denies needs. ANI Macdonald       ECOC on chart for MD signature  56 Spoke with IM reports will dc this date. Tara passed 02 testing denies all other needs. ANI Macdonald

## 2021-09-17 NOTE — PROGRESS NOTES
Pt assessment completed and charted. VSS. Pt a/o x4. Lung sounds clear to auscultation in all fields. Pt currently on room air. Continuous SpO2 monitor in place. Pt instructed to call out for assistance prior to ambulation. Encouraged/educated pt on prone position and laying on side. Pt using IS. Bed in lowest position and wheels locked. Call light within reach. Bedside table within reach. Non-skid footwear in place. Pt denies any other needs at this time. Pt calls out appropriately. Will continue to monitor.

## 2021-09-17 NOTE — FLOWSHEET NOTE
9.17/21/Patient Janel Caballero does not want to do an AD & LW at this time.      09/17/21 1605   Encounter Summary   Services provided to: Patient   Continue Visiting   (9.17.21/Patient refused to do AD)   Complexity of Encounter Moderate   Length of Encounter 15 minutes

## 2021-09-17 NOTE — DISCHARGE SUMMARY
CONTRAST   Final Result   1. Negative for pulmonary embolus. 2. Multifocal bilateral atypical pneumonia. XR CHEST PORTABLE   Final Result   Findings are consistent with multifocal pneumonia, to include atypical viral   causes. Consults:     IP CONSULT TO HOSPITALIST  IP CONSULT TO SPIRITUAL SERVICES    Disposition:  home     Condition at Discharge: Stable    Discharge Instructions/Follow-up:  Monitor pulse ox at home    Code Status:  Full Code     Activity: activity as tolerated    Diet: regular diet      Discharge Medications:     Discharge Medication List as of 9/17/2021  5:39 PM           Details   dexamethasone (DECADRON) 6 MG tablet Take 1 tablet by mouth daily for 8 days Always with food, Disp-8 tablet, R-0Normal              Details   mirtazapine (REMERON) 15 MG tablet Take 15 mg by mouth nightlyHistorical Med      clonazePAM (KLONOPIN) 0.5 MG tablet Take 0.5 mg by mouth 2 times daily as needed. Historical Med      acyclovir (ZOVIRAX) 200 MG capsule Take by mouth as neededHistorical Med      indomethacin (INDOCIN) 50 MG capsule Take 1 capsule by mouth 3 times daily, Disp-21 capsule, R-5Normal      sildenafil (VIAGRA) 100 MG tablet Take 1 tablet by mouth as needed for Erectile Dysfunction, Disp-12 tablet,R-3Normal      DiphenhydrAMINE HCl (BENADRYL PO) Take 25 mg by mouth Take 1 -1.5 tabs at nightHistorical Med             Time Spent on discharge is more than 30 minutes in the examination, evaluation, counseling and review of medications and discharge plan. Signed:    Emory Garg MD   9/17/2021      Thank you LIN Lama CNP for the opportunity to be involved in this patient's care. If you have any questions or concerns please feel free to contact me at [phone].

## 2021-09-17 NOTE — PROGRESS NOTES
Oxygen documentation:      1. O2 saturation at REST on ROOM AIR = ___96___%      If saturation is 89% or above please proceed with steps 2 and 3.      2. O2 saturation with AMBULATION of __90___ feet on ROOM AIR = __94___%  3.  O2 saturation with AMBULATION on current liter flow = ______%      DCP notified: ______  Alfredo Peguero

## 2021-09-17 NOTE — ACP (ADVANCE CARE PLANNING)
Advance Care Planning     General Advance Care Planning (ACP) Conversation    Date of Conversation: 9/16/2021  Conducted with: Patient with Decision Making Capacity    Healthcare Decision Maker:    Primary Decision Maker: [address] [facility] - [phone]    Secondary Decision Maker: Zeus Cartagena - Other - [phone]    Supplemental (Other) Decision Maker: Nicole Hawk - Parent - [phone]  Click here to complete [address] including selection of the Healthcare Decision Maker Relationship (ie \"Primary\"). Today we documented desired Decision Maker(s), who is (are) NOT Legal Next of Kin. ACP documents are required for decision maker authority.     Content/Action Overview:  Has NO ACP documents/care preferences - requested patient complete ACP documents  Reviewed DNR/DNI and patient elects Full Code (Attempt Resuscitation)    Length of Voluntary ACP Conversation in minutes:  <16 minutes (Non-Billable)    ANI Marquis

## 2021-09-20 ENCOUNTER — CARE COORDINATION (OUTPATIENT)
Dept: CASE MANAGEMENT | Age: 61
End: 2021-09-20

## 2021-09-20 NOTE — CARE COORDINATION
[facility] Transitions Initial Follow Up Call    Call within 2 business days of discharge: Yes    Patient: Nima Gao Patient : 1960   MRN: [id number]  Reason for Admission: PNA d/t covid  Discharge Date: 21 RARS: Readmission Risk Score: 7      Last Discharge [facility]       Complaint Diagnosis Description Type Department Provider    21 Positive For Covid-19 Pneumonia due to COVID-19 virus . .. ED to Hosp-Admission (Discharged) (ADMITTED) Marla Lux MD; Olga Theodore. .. Spoke with: [address] [facility]    Transitions of Care Initial Call    Challenges to be reviewed by the provider   Additional needs identified to be addressed with provider: No  none             Method of communication with provider : phone      Advance Care Planning:   Does patient have an Advance Directive: reviewed and current, reviewed and needs to be updated, not on file; education provided, not on file, patient declined education, decision maker updated and referral to internal ACP facilitator. Was this a readmission? No  Patient stated reason for admission: covid  Patients top risk factors for readmission: medical condition-covid    Care Transition Nurse (CTN) contacted the patient by telephone to perform post hospital discharge assessment. Verified name and  with patient as identifiers. Provided introduction to self, and explanation of the CTN role. CTN reviewed discharge instructions, medical action plan and red flags with patient who verbalized understanding. Patient given an opportunity to ask questions and does not have any further questions or concerns at this time. Were discharge instructions available to patient? Yes. Reviewed appropriate site of care based on symptoms and resources available to patient including: PCP. The patient agrees to contact the PCP office for questions related to their healthcare.      Medication reconciliation was performed with patient, who verbalizes understanding of administration of home medications. Advised obtaining a 90-day supply of all daily and as-needed medications. Covid Risk Education     Educated patient about risk for severe COVID-19 due to risk factors according to CDC guidelines. CTN reviewed discharge instructions, medical action plan and red flag symptoms with the patient who verbalized understanding. Discussed COVID vaccination status: Yes. Education provided on COVID-19 vaccination as appropriate. Discussed exposure protocols and quarantine with CDC Guidelines. Patient was given an opportunity to verbalize any questions and concerns and agrees to contact CTN or health care provider for questions related to their healthcare. Reviewed and educated patient on any new and changed medications related to discharge diagnosis. Was patient discharged with a pulse oximeter? Yes Discussed and confirmed pulse oximeter discharge instructions and when to notify provider or seek emergency care. Patient answered call and verified . Patient pleasant and agreeable to transition call. Patient reviewed all medications and confirmed that he is taking as directed. Patient stated that he did not schedule follow up visit and did not feel that he needed a follow up visit. Patient stated that he was able to get all of his questions answered during his hospital stay. Patient confirmed that he had PCP number and would call if needed. Patient has pulse oximeter and has been running in the mid 90s% and aware of guideline. Denies any acute needs at present time. Agreeable to f/u calls. Educated on the use of urgent care or physicians 24 hr access line if assistance is needed after hours. CTN provided contact information. Plan for follow-up call in 5-7 days based on severity of symptoms and risk factors.       Care Transitions 24 Hour Call    Do you have any ongoing symptoms?: No  Do you have a copy of your discharge instructions?: Yes  Do you have all of your prescriptions and are they filled?: Yes  Have you been contacted by a [address]?: No  Have you scheduled your follow up appointment?: No  Were you discharged with any Home Care or Post Acute Services: No  Do you feel like you have everything you need to keep you well at home?: Yes  Care Transitions Interventions         Follow Up  No future appointments.     Dagmar Gan RN

## 2021-09-27 ENCOUNTER — CARE COORDINATION (OUTPATIENT)
Dept: CASE MANAGEMENT | Age: 61
End: 2021-09-27

## 2021-09-27 NOTE — CARE COORDINATION
[facility] Transitions Follow Up Call    2021    Patient: Erin Doyle  Patient : 1960   MRN: [id number]  Reason for Admission: covid  Discharge Date: 21 RARS: Readmission Risk Score: 7         Spoke with: Erin Doyle    Patient answered call and verified . Patient doing \"better but not 100% yet\". Patient has seen PCP since hospitalization and received his back to work note. patient denied any fever and oxygen level is staying above 90% on room air. Patient confirmed that he has completed dexamethasone and returned back to work today. Patient denied any acute needs at present time. patient declined any further transition calls needed and episode closed. Educated on the use of urgent care or physicians 24 hr access line if assistance is needed after hours. Care Transitions Subsequent and Final Call    Subsequent and Final Calls  Care Transitions Interventions  Other Interventions: Follow Up  No future appointments.     Reynaldo Mehta RN

## 2022-03-08 ENCOUNTER — TELEPHONE (OUTPATIENT)
Dept: GASTROENTEROLOGY | Age: 62
End: 2022-03-08

## 2022-03-08 RX ORDER — POLYETHYLENE GLYCOL 3350 17 G/17G
238 POWDER ORAL DAILY
Qty: 255 G | Refills: 0 | Status: ON HOLD
Start: 2022-03-08 | End: 2022-03-25 | Stop reason: HOSPADM

## 2022-03-08 NOTE — TELEPHONE ENCOUNTER
Patient is scheduled for his routine colonoscopy on 3/25 in [facility] @ 11:45am. Inges prep sent to [facility] [phone]  I am emailing instructions to [email or web address]

## 2022-03-19 ENCOUNTER — HOSPITAL ENCOUNTER (OUTPATIENT)
Age: 62
Discharge: HOME | End: 2022-03-19
Payer: COMMERCIAL

## 2022-03-19 PROCEDURE — U0005 INFEC AGEN DETEC AMPLI PROBE: HCPCS

## 2022-03-19 PROCEDURE — U0003 INFECTIOUS AGENT DETECTION BY NUCLEIC ACID (DNA OR RNA); SEVERE ACUTE RESPIRATORY SYNDROME CORONAVIRUS 2 (SARS-COV-2) (CORONAVIRUS DISEASE [COVID-19]), AMPLIFIED PROBE TECHNIQUE, MAKING USE OF HIGH THROUGHPUT TECHNOLOGIES AS DESCRIBED BY CMS-2020-01-R: HCPCS

## 2022-03-20 LAB — SARS-COV-2: NOT DETECTED

## 2022-03-21 NOTE — PROGRESS NOTES
Preoperative Screening for Elective Surgery/Invasive Procedures While COVID-19 present in the community     Have you had any of the following symptoms? No  o Fever, chills  o Cough  o Shortness of breath  o Muscle aches/pain  o Diarrhea  o Abdominal pain, nausea, vomiting  o Loss or decrease in taste and / or smell   Risk of Exposure  o Have you recently been hospitalized for COVID-19 or flu-like illness, if so when? No  o Recently diagnosed with COVID-19, if so when? No  o Recently tested for COVID-19, if so when? 3/19/22-negative  o Have you been in close contact with a person or family member who currently has or recently had COVID-23? If yes, when and in what context? No  o Do you live with anybody who in the last 14 days has had fever, chills, shortness of breath, muscle aches, flu-like illness? No  o Do you have any close contacts or family members who are currently in the hospital for COVID-19 or flu-like illness? No  If yes, assess recent close contact with this person. Indicate if the patient has a positive screen by answering yes to one or more of the above questions. Patients who test positive or screen positive prior to surgery or on the day of surgery should be evaluated in conjunction with the surgeon/proceduralist/anesthesiologist to determine the urgency of the procedure.

## 2022-03-21 NOTE — PROGRESS NOTES
Obstructive Sleep Apnea (STEFFANY) Screening     Patient:  Tyler Manning    YOB: 1960      Medical Record #:  [id number]                     Date:  3/21/2022     1. Are you a loud and/or regular snorer? []  Yes       [x] No    2. Have you been observed to gasp or stop breathing during sleep? []  Yes       [x] No    3. Do you feel tired or groggy upon awakening or do you awaken with a headache?           []  Yes       [] No    4. Are you often tired or fatigued during the wake time hours? []  Yes       [] No    5. Do you fall asleep sitting, reading, watching TV or driving? []  Yes       [] No    6. Do you often have problems with memory or concentration? []  Yes       [] No    **If patient's score is ? 3 they are considered high risk for STEFFANY. An Anesthesia provider will evaluate the patient and develop a plan of care the day of surgery. Note:  If the patient's BMI is more than 35 kg m¯² , has neck circumference > 40 cm, and/or high blood pressure the risk is greater (© American Sleep Apnea Association, 2006).

## 2022-03-25 ENCOUNTER — ANESTHESIA EVENT (OUTPATIENT)
Dept: ENDOSCOPY | Age: 62
End: 2022-03-25
Payer: COMMERCIAL

## 2022-03-25 ENCOUNTER — HOSPITAL ENCOUNTER (OUTPATIENT)
Age: 62
Setting detail: OUTPATIENT SURGERY
Discharge: HOME | End: 2022-03-25
Attending: INTERNAL MEDICINE | Admitting: INTERNAL MEDICINE
Payer: COMMERCIAL

## 2022-03-25 ENCOUNTER — ANESTHESIA (OUTPATIENT)
Dept: ENDOSCOPY | Age: 62
End: 2022-03-25
Payer: COMMERCIAL

## 2022-03-25 VITALS
RESPIRATION RATE: 12 BRPM | BODY MASS INDEX: 25.03 KG/M2 | SYSTOLIC BLOOD PRESSURE: 116 MMHG | DIASTOLIC BLOOD PRESSURE: 71 MMHG | WEIGHT: 195 LBS | OXYGEN SATURATION: 99 % | HEIGHT: 74 IN | HEART RATE: 66 BPM | TEMPERATURE: 96.8 F

## 2022-03-25 VITALS — OXYGEN SATURATION: 98 % | DIASTOLIC BLOOD PRESSURE: 63 MMHG | SYSTOLIC BLOOD PRESSURE: 100 MMHG

## 2022-03-25 DIAGNOSIS — Z12.11 SCREEN FOR COLON CANCER: ICD-10-CM

## 2022-03-25 PROCEDURE — 3609010300 HC COLONOSCOPY W/BIOPSY SINGLE/MULTIPLE: Performed by: INTERNAL MEDICINE

## 2022-03-25 PROCEDURE — 45380 COLONOSCOPY AND BIOPSY: CPT | Performed by: INTERNAL MEDICINE

## 2022-03-25 PROCEDURE — 7100000010 HC PHASE II RECOVERY - FIRST 15 MIN: Performed by: INTERNAL MEDICINE

## 2022-03-25 PROCEDURE — 2580000003 HC RX 258: Performed by: INTERNAL MEDICINE

## 2022-03-25 PROCEDURE — 3700000000 HC ANESTHESIA ATTENDED CARE: Performed by: INTERNAL MEDICINE

## 2022-03-25 PROCEDURE — 2580000003 HC RX 258: Performed by: ANESTHESIOLOGY

## 2022-03-25 PROCEDURE — 7100000011 HC PHASE II RECOVERY - ADDTL 15 MIN: Performed by: INTERNAL MEDICINE

## 2022-03-25 PROCEDURE — 2709999900 HC NON-CHARGEABLE SUPPLY: Performed by: INTERNAL MEDICINE

## 2022-03-25 PROCEDURE — 6360000002 HC RX W HCPCS: Performed by: ANESTHESIOLOGY

## 2022-03-25 PROCEDURE — 88305 TISSUE EXAM BY PATHOLOGIST: CPT

## 2022-03-25 PROCEDURE — 3700000001 HC ADD 15 MINUTES (ANESTHESIA): Performed by: INTERNAL MEDICINE

## 2022-03-25 PROCEDURE — 2500000003 HC RX 250 WO HCPCS: Performed by: ANESTHESIOLOGY

## 2022-03-25 RX ORDER — SODIUM CHLORIDE, SODIUM LACTATE, POTASSIUM CHLORIDE, CALCIUM CHLORIDE 600; 310; 30; 20 MG/100ML; MG/100ML; MG/100ML; MG/100ML
INJECTION, SOLUTION INTRAVENOUS CONTINUOUS PRN
Status: DISCONTINUED | OUTPATIENT
Start: 2022-03-25 | End: 2022-03-25 | Stop reason: SDUPTHER

## 2022-03-25 RX ORDER — PROPOFOL 10 MG/ML
INJECTION, EMULSION INTRAVENOUS PRN
Status: DISCONTINUED | OUTPATIENT
Start: 2022-03-25 | End: 2022-03-25 | Stop reason: SDUPTHER

## 2022-03-25 RX ORDER — SODIUM CHLORIDE, SODIUM LACTATE, POTASSIUM CHLORIDE, CALCIUM CHLORIDE 600; 310; 30; 20 MG/100ML; MG/100ML; MG/100ML; MG/100ML
INJECTION, SOLUTION INTRAVENOUS ONCE
Status: COMPLETED | OUTPATIENT
Start: 2022-03-25 | End: 2022-03-25

## 2022-03-25 RX ADMIN — SODIUM CHLORIDE, POTASSIUM CHLORIDE, SODIUM LACTATE AND CALCIUM CHLORIDE: 600; 310; 30; 20 INJECTION, SOLUTION INTRAVENOUS at 11:28

## 2022-03-25 RX ADMIN — PROPOFOL 100 MG: 10 INJECTION, EMULSION INTRAVENOUS at 11:53

## 2022-03-25 RX ADMIN — PROPOFOL 30 MG: 10 INJECTION, EMULSION INTRAVENOUS at 12:04

## 2022-03-25 RX ADMIN — PROPOFOL 30 MG: 10 INJECTION, EMULSION INTRAVENOUS at 12:02

## 2022-03-25 RX ADMIN — LIDOCAINE HYDROCHLORIDE 5 ML: 10 INJECTION, SOLUTION INFILTRATION; PERINEURAL at 11:53

## 2022-03-25 RX ADMIN — PROPOFOL 30 MG: 10 INJECTION, EMULSION INTRAVENOUS at 11:56

## 2022-03-25 RX ADMIN — SODIUM CHLORIDE, SODIUM LACTATE, POTASSIUM CHLORIDE, AND CALCIUM CHLORIDE: .6; .31; .03; .02 INJECTION, SOLUTION INTRAVENOUS at 11:51

## 2022-03-25 RX ADMIN — PROPOFOL 30 MG: 10 INJECTION, EMULSION INTRAVENOUS at 11:58

## 2022-03-25 RX ADMIN — PROPOFOL 30 MG: 10 INJECTION, EMULSION INTRAVENOUS at 12:00

## 2022-03-25 ASSESSMENT — PAIN SCALES - GENERAL
PAINLEVEL_OUTOF10: 0

## 2022-03-25 NOTE — ANESTHESIA POSTPROCEDURE EVALUATION
Department of Anesthesiology  Postprocedure Note    Patient: Iván Viera  MRN: [id number]  YOB: 1960  Date of evaluation: 3/25/2022  Time:  12:54 PM     Procedure Summary     Date: 03/25/22 Room / Location: [facility] Asha Villalobos [place] [address] / [facility]    Anesthesia Start: 6436 Anesthesia Stop: 4929    Procedure: COLONOSCOPY WITH BIOPSY (N/A ) Diagnosis:       Screen for colon cancer      (SCREENING FOR COLON CANCER)    Surgeons: Parth Coulter MD Responsible Provider: Lisandro Mccall MD    Anesthesia Type: MAC ASA Status: 1          Anesthesia Type: MAC    Phlilip Phase I:      Phillip Phase II: Phillip Score: 10    Last vitals: Reviewed and per EMR flowsheets.        Anesthesia Post Evaluation    Comments: Postoperative Anesthesia Note    Name:    Iván Viera  MRN:      [id number]    Patient Vitals in the past 12 hrs:  03/25/22 1234, BP:116/71, Pulse:66, Resp:12, SpO2:99 %  03/25/22 1224, BP:115/65, Pulse:59, Resp:14, SpO2:97 %  03/25/22 1214, BP:108/71, Pulse:68, Resp:15, SpO2:96 %  03/25/22 1122, BP:137/85, Temp:96.8 °F (36 °C), Pulse:67, Resp:16, SpO2:97 %     LABS:    CBC  Lab Results       Component                Value               Date/Time                  WBC                      7.9                 09/16/2021 07:54 AM        HGB                      12.5 (L)            09/16/2021 07:54 AM        HCT                      35.5 (L)            09/16/2021 07:54 AM        PLT                      116 (L)             09/16/2021 07:54 AM   RENAL  Lab Results       Component                Value               Date/Time                  NA                       136                 09/16/2021 07:54 AM        K                        3.9                 09/16/2021 07:54 AM        CL                       101                 09/16/2021 07:54 AM        CO2                      24                  09/16/2021 07:54 AM        BUN                      17                  09/16/2021 07:54 AM        CREATININE               1.0                 09/16/2021 07:54 AM        GLUCOSE                  161 (H)             09/16/2021 07:54 AM   JAY  Lab Results       Component                Value               Date/Time                  PROTIME                  12.0                09/17/2021 05:44 AM        INR                      1.06                09/17/2021 05:44 AM     Intake & Output:  In: 250 (I.V.:250)  Out: -     Nausea & Vomiting:  No    Level of Consciousness:  Awake    Pain Assessment:  Adequate analgesia    Anesthesia Complications:  No apparent anesthetic complications    SUMMARY      Vital signs stable  OK to discharge from Stage I post anesthesia care.   Care transferred from Anesthesiology department on discharge from perioperative area

## 2022-03-25 NOTE — ANESTHESIA PRE PROCEDURE
Department of Anesthesiology  Preprocedure Note       Name:  Mary Ann Matamoros   Age:  63 y.o.  :  1960                                          MRN:  [id number]         Date:  3/25/2022      Surgeon: Kevin Villatoro):  Gilmer Deng MD    Procedure: Procedure(s):  COLONOSCOPY    Medications prior to admission:   Prior to Admission medications    Medication Sig Start Date End Date Taking? Authorizing Provider   polyethylene glycol (MIRALAX) 17 GM/SCOOP POWD powder Take 238 g by mouth daily Take as directed for colonoscopy 3/8/22   LIN Santiago CNP   mirtazapine (REMERON) 15 MG tablet Take 15 mg by mouth nightly    Historical Provider, MD   clonazePAM (KLONOPIN) 0.5 MG tablet Take 0.5 mg by mouth 2 times daily as needed. Historical Provider, MD   acyclovir (ZOVIRAX) 200 MG capsule Take by mouth as needed    Historical Provider, MD   indomethacin (INDOCIN) 50 MG capsule Take 1 capsule by mouth 3 times daily  Patient taking differently: Take 50 mg by mouth as needed  9/10/20   LIN Enciso CNP   sildenafil (VIAGRA) 100 MG tablet Take 1 tablet by mouth as needed for Erectile Dysfunction 20   LIN Enciso CNP   DiphenhydrAMINE HCl (BENADRYL PO) Take 25 mg by mouth at bedtime     Historical Provider, MD       Current medications:    No current facility-administered medications for this encounter.        Allergies:  No Known Allergies    Problem List:    Patient Active Problem List   Diagnosis Code    Acute respiratory failure ([facility] [address] J96.00       Past Medical History:        Diagnosis Date    Anxiety     siuational    COVID-19     Gout     Testicular mass     History of: Testicular US normal       Past Surgical History:        Procedure Laterality Date    CARDIOVASCULAR STRESS TEST  2015    Stress Echo    COLONOSCOPY  10/12    no specimens    GUM SURGERY      grafting upper and lower    VASECTOMY         Social History:    Social History Tobacco Use    Smoking status: Never Smoker    Smokeless tobacco: Never Used   Substance Use Topics    Alcohol use: Yes     Comment: 3-5 drinks per week                                Counseling given: Not Answered      Vital Signs (Current):   Vitals:    03/21/22 0907 03/25/22 1122   BP:  137/85   Pulse:  67   Resp:  16   Temp:  96.8 °F (36 °C)   SpO2:  97%   Weight: 195 lb (88.5 kg)    Height: 6' 2\" (1.88 m)                                               BP Readings from Last 3 Encounters:   03/25/22 137/85   09/17/21 116/74   04/13/21 118/76       NPO Status: Time of last liquid consumption: 0645                        Time of last solid consumption: 1159                        Date of last liquid consumption: 03/25/22                        Date of last solid food consumption: 03/23/22    BMI:   Wt Readings from Last 3 Encounters:   03/21/22 195 lb (88.5 kg)   09/16/21 199 lb 15.3 oz (90.7 kg)   04/13/21 192 lb 9.6 oz (87.4 kg)     Body mass index is 25.04 kg/m². CBC:   Lab Results   Component Value Date    WBC 7.9 09/16/2021    RBC 3.90 09/16/2021    HGB 12.5 09/16/2021    HCT 35.5 09/16/2021    MCV 90.9 09/16/2021    RDW 13.1 09/16/2021     09/16/2021       CMP:   Lab Results   Component Value Date     09/16/2021    K 3.9 09/16/2021     09/16/2021    CO2 24 09/16/2021    BUN 17 09/16/2021    CREATININE 1.0 09/16/2021    GFRAA >60 09/16/2021    AGRATIO 0.9 09/16/2021    LABGLOM >60 09/16/2021    GLUCOSE 161 09/16/2021    PROT 7.2 09/16/2021    CALCIUM 8.8 09/16/2021    BILITOT 0.4 09/16/2021    ALKPHOS 76 09/16/2021    AST 58 09/16/2021    ALT 49 09/16/2021       POC Tests: No results for input(s): POCGLU, POCNA, POCK, POCCL, POCBUN, POCHEMO, POCHCT in the last 72 hours.     Coags:   Lab Results   Component Value Date    PROTIME 12.0 09/17/2021    INR 1.06 09/17/2021       HCG (If Applicable): No results found for: PREGTESTUR, PREGSERUM, HCG, HCGQUANT     ABGs: No results found for:

## 2022-03-25 NOTE — PROGRESS NOTES
Family and patient updated per Md. Wife updated in waiting room. Discharge instructions reviewed with patient and responsible adult. Discharge instructions signed and copy given with no additional questions. Patient to be discharged home with belongings.

## 2022-03-25 NOTE — OP NOTE
[address] Lianne Castaneda [facility]  Phone: [phone] [facility] [address],  [address] [place] [address], [address] [place] [address]  Phone: [phone]   RVN:[phone]    Colonoscopy Procedure Note    Patient: Lisy Flowers  : 1960    Procedure: Colonoscopy with polypectomy (cold biopsy)    Date:  3/25/2022     Endoscopist:  Mallory Huang MD    Referring Physician:  Marikay Denver, APRN - CNP    Preoperative Diagnosis:  Screen for colon cancer [Z12.11]    Postoperative Diagnosis:  See impression    Anesthesia: Anesthesia: MAC  Sedation:  Propofol per anesthesia  Start Time: 5073  Stop Time: 1209  ASA Class: 2  Mallampati: II (soft palate, uvula, fauces visible)    Indications: This is a 63y.o. year old male with medical history of COVID 19 pneumonia 2021, Gout seen independently with referral for colon cancer screening. Procedure Details  Informed consent was obtained for the procedure, including sedation. Risks of perforation, hemorrhage, adverse drug reaction and aspiration were discussed. The patient was placed in the left lateral decubitus position. Based on the pre-procedure assessment, including review of the patient's medical history, medications, allergies, and review of systems, he had been deemed to be an appropriate candidate for sedation; he was therefore sedated with the medications listed below. The patient was monitored continuously with ECG tracing, pulse oximetry, blood pressure monitoring, and direct observations. rectal examination was performed. There were no external hemorrhoids, fissures or skin tags. The colonoscope was inserted into the rectum and advanced under direct vision to the terminal ileum. The right colon was examined twice as this increases polyp detection especially if other right colon polyps, older age, male, or boyd syndrome.   When segments could not be distended with CO2, it was filled/distended with water. The quality of the colonic preparation was good. A careful inspection was made as the colonoscope was withdrawn, including a retroflexed view of the rectum; findings and interventions are described below. Appropriate photodocumentation Was Obtained: terminal ileum, appendiceal orifice and ileocecal valve. Findings:   A 4 mm sessile polyp in the descending colon, removed by cold forceps and retrieved for pathology. No bleeding noted. Normal appearing terminal ileum. Medium sized non bleeding internal hemorrhoids.     - PREP: MiraLAX and Dulcolax   - Overall difficulty: mild in degree  - Abdominal pressure: yes - left lower quadrant  - Change in position: no  - Anesthesia issues: no  - Endocoff/Amplieye use: no    Specimens: Was Obtained:     Complications:   None; patient tolerated the procedure well. Disposition:   PACU - hemodynamically stable. Estimated Blood loss:  none    Withdrawal Time:  11 minutes    Impression:   A 4 mm sessile polyp in the descending colon, removed by cold forceps and retrieved. Normal appearing terminal ileum. Medium sized non bleeding internal hemorrhoids. Recommendations:  -Await pathology. ,   -Repeat colonoscopy in 5 years. ,  -Follow up with primary care physician.         Carmen Rod MD 3/25/22 12:09 PM EDT

## 2022-03-25 NOTE — H&P
[address] [facility],  [address] [place] [address] Ave  [place], [address] [place] [address]  Phone: [phone]   IOJ:[phone]    CHIEF COMPLAINT     Colon cancer screening     HPI     Thank you LIN Barroso CNP for asking me to see Pastora Gama in consultation. Pastora Gama is a 63 y.o. male  [2] White (non-) [1] with medical history of COVID 19 pneumonia 09/2021, Gout,  seen independently with referral for colon cancer screening. Denies abdominal pain, nausea, vomiting, fever, chills, unintentional weight loss, constipation, diarrhea, hematochezia or melenic stools. No family history of colon cancer. Last Encounter Reviewed: None  Pertinent PMH, FH, SH is reviewed below. Last EGD: None  Last Colonoscopy: 2012 - normal per patient    Review of available records reveals:   Wt Readings from Last 50 Encounters:   09/16/21 199 lb 15.3 oz (90.7 kg)   04/13/21 192 lb 9.6 oz (87.4 kg)   07/09/18 191 lb 9.6 oz (86.9 kg)   05/29/18 189 lb (85.7 kg)   12/05/17 189 lb (85.7 kg)   12/05/16 197 lb (89.4 kg)   10/31/16 192 lb 12.8 oz (87.5 kg)   04/05/16 186 lb (84.4 kg)   12/22/15 187 lb (84.8 kg)   11/24/15 183 lb (83 kg)   10/12/12 185 lb (83.9 kg)   10/27/11 184 lb (83.5 kg)       No components found for: HGBA1C  BP Readings from Last 3 Encounters:   09/17/21 116/74   04/13/21 118/76   07/09/18 125/82     Health Maintenance   Topic Date Due    Hepatitis C screen  Never done    HIV screen  Never done    DTaP/Tdap/Td vaccine (1 - Tdap) Never done    Diabetes screen  Never done    Shingles Vaccine (1 of 2) Never done    Flu vaccine (1) Never done    COVID-19 Vaccine (2 - Pfizer 3-dose series) 09/21/2021    Depression Screen  04/13/2022    Colorectal Cancer Screen  10/12/2022    Lipid screen  06/14/2023    Hepatitis A vaccine  Aged Out    Hepatitis B vaccine  Aged Out    Hib vaccine  Aged Out    Meningococcal (ACWY) vaccine  Aged Out    Pneumococcal 0-64 years Vaccine Aged Out       No components found for: [facility]     PAST MEDICAL HISTORY     Past Medical History:   Diagnosis Date    Anxiety     siuational    COVID-19     Gout     Testicular mass     History of: Testicular US normal     FAMILY HISTORY     Family History   Problem Relation Age of Onset    Mental Illness Mother         agoraphobia    Other Mother         anxiety, agoraphobia    Alzheimer's Disease Mother     No Known Problems Father     Heart Disease Neg Hx      SOCIAL HISTORY     Social History     Socioeconomic History    Marital status:      Spouse name: Not on file    Number of children: Not on file    Years of education: Not on file    Highest education level: Not on file   Occupational History    Not on file   Tobacco Use    Smoking status: Never Smoker    Smokeless tobacco: Never Used   Vaping Use    Vaping Use: Never used   Substance and Sexual Activity    Alcohol use: Yes     Comment: 3-5 drinks per week    Drug use: No    Sexual activity: Yes     Partners: Female   Other Topics Concern    Not on file   Social History Narrative    Not on file     Social Determinants of Health     Financial Resource Strain:     Difficulty of Paying Living Expenses: Not on file   Food Insecurity:     Worried About Running Out of Food in the Last Year: Not on file    Silke of Food in the Last Year: Not on file   Transportation Needs:     Lack of Transportation (Medical): Not on file    Lack of Transportation (Non-Medical):  Not on file   Physical Activity:     Days of Exercise per Week: Not on file    Minutes of Exercise per Session: Not on file   Stress:     Feeling of Stress : Not on file   Social Connections:     Frequency of Communication with Friends and Family: Not on file    Frequency of Social Gatherings with Friends and Family: Not on file    Attends Advent Services: Not on file    Active Member of Clubs or Organizations: Not on file    Attends Club or Organization Meetings: Not on file    Marital Status: Not on file   Intimate Partner Violence:     Fear of Current or Ex-Partner: Not on file    Emotionally Abused: Not on file    Physically Abused: Not on file    Sexually Abused: Not on file   Housing Stability:     Unable to Pay for Housing in the Last Year: Not on file    Number of [place] in the Last Year: Not on file    Unstable Housing in the Last Year: Not on file     SURGICAL HISTORY     Past Surgical History:   Procedure Laterality Date    CARDIOVASCULAR STRESS TEST  12/24/2015    Stress Echo    COLONOSCOPY  10/12    no specimens    GUM SURGERY      grafting upper and lower   [id number]   (This list may include medications prescribed during this encounter as epic can not insert only the list prior to this encounter.)  Current Outpatient Rx   Medication Sig Dispense Refill    polyethylene glycol (MIRALAX) 17 GM/SCOOP POWD powder Take 238 g by mouth daily Take as directed for colonoscopy 255 g 0    mirtazapine (REMERON) 15 MG tablet Take 15 mg by mouth nightly      clonazePAM (KLONOPIN) 0.5 MG tablet Take 0.5 mg by mouth 2 times daily as needed.  acyclovir (ZOVIRAX) 200 MG capsule Take by mouth as needed      indomethacin (INDOCIN) 50 MG capsule Take 1 capsule by mouth 3 times daily (Patient taking differently: Take 50 mg by mouth as needed ) 21 capsule 5    sildenafil (VIAGRA) 100 MG tablet Take 1 tablet by mouth as needed for Erectile Dysfunction 12 tablet 3    DiphenhydrAMINE HCl (BENADRYL PO) Take 25 mg by mouth at bedtime        ALLERGIES   No Known Allergies  IMMUNIZATIONS     Immunization History   Administered Date(s) Administered    COVID-19, Pfizer Purple top, DILUTE for use, 12+ yrs, 30mcg/0.3mL dose 08/31/2021     REVIEW OF SYSTEMS   See HPI for further details and pertinent postiives. Negative for the following:  Constitutional: Negative for weight change. Negative for appetite change and fatigue. HENT: Negative for nosebleeds, sore throat, mouth sores, and voice change. Respiratory: Negative for cough, choking and chest tightness. Cardiovascular: Negative for chest pain   Gastrointestinal: See HPI  Musculoskeletal: Negative for arthralgias. Skin: Negative for pallor. Neurological: Negative for weakness and light-headedness. Hematological: Negative for adenopathy. Does not bruise/bleed easily. Psychiatric/Behavioral: Negative for suicidal ideas. PHYSICAL EXAM   VITAL SIGNS: Ht 6' 2\" (1.88 m)   Wt 195 lb (88.5 kg)   BMI 25.04 kg/m²   Wt Readings from Last 3 Encounters:   09/16/21 199 lb 15.3 oz (90.7 kg)   04/13/21 192 lb 9.6 oz (87.4 kg)   07/09/18 191 lb 9.6 oz (86.9 kg)     Constitutional: Well developed, Well nourished, No acute distress, Non-toxic appearance. HENT: Normocephalic, Atraumatic, Bilateral external ears normal, Oropharynx moist, No oral exudates, Nose normal.   Eyes: Conjunctiva normal, No discharge. Neck: Normal range of motion, No tenderness, Supple  Cardiovascular: Normal heart rate, Normal rhythm, No murmurs. Thorax & Lungs: Normal breath sounds, No respiratory distress,   Abdomen: normal bowel sounds, soft, non tender, non distended, no hernias   Rectal:  Deferred. Skin: Warm, Dry, No erythema, No rash. No bruising  Lower Extremities: Intact distal pulses, No edema, No tenderness, No cyanosis, No clubbing. Neurologic: Alert & oriented x 3, Normal motor function, Normal sensory function, No focal deficits noted. RADIOLOGY/PROCEDURES   No results found. FINAL IMPRESSION   Colon cancer screening    Plan: colonoscopy    Colonoscopy with alternatives, rationale, benefits and risks, not limited to bleeding, infection, perforation, need of surgery, prolong hospital stay and anesthesia side effects were explained. Patient verbalized understanding and agrees to proceed with colonoscopy.      ORDERED FUTURE/PENDING TESTS       FOLLOWUP   No follow-ups on file.          Stef Camacho MD 3/24/22 8:41 PM EDT    CC:  LIN Cantu - CNP

## 2022-04-26 DIAGNOSIS — B00.1 COLD SORE: ICD-10-CM

## 2022-04-26 RX ORDER — VALACYCLOVIR HYDROCHLORIDE 1 G/1
2000 TABLET, FILM COATED ORAL 2 TIMES DAILY
Qty: 4 TABLET | Refills: 5 | Status: SHIPPED | OUTPATIENT
Start: 2022-04-26 | End: 2022-04-27

## 2022-06-14 ENCOUNTER — PATIENT MESSAGE (OUTPATIENT)
Dept: FAMILY MEDICINE CLINIC | Age: 62
End: 2022-06-14

## 2022-06-15 RX ORDER — DOXYCYCLINE HYCLATE 200 MG/1
TABLET, DELAYED RELEASE ORAL
Qty: 1 TABLET | Refills: 0 | Status: SHIPPED | OUTPATIENT
Start: 2022-06-15

## 2022-06-15 NOTE — TELEPHONE ENCOUNTER
[facility] calling. They are out of the Doxycycline Hyclate  Doxycycline Hyclate 200 MG TBEC  And insurance will not cover on top of that. Requesting alternative medication please.

## 2022-06-15 NOTE — TELEPHONE ENCOUNTER
From: Saida Thomas  To: Bijan Kate  Sent: 6/14/2022 9:54 PM EDT  Subject: Tick bite    Tonight I found a tick in my shorts and appear to have a small tick bite on my thigh. No bullseye rash or other symptoms. Just returned from a farm in [place] at which ticks were prevalent and where I had worn the shorts so I am sure that is where the tick came from. As a precaution should I get a prescription for Doxycycline or other antibiotic? Please advise.  Thanks, Aurora Gama

## 2024-07-06 ENCOUNTER — OFFICE VISIT (OUTPATIENT)
Age: 64
End: 2024-07-06

## 2024-07-06 VITALS
HEIGHT: 74 IN | SYSTOLIC BLOOD PRESSURE: 160 MMHG | HEART RATE: 64 BPM | WEIGHT: 202.2 LBS | BODY MASS INDEX: 25.95 KG/M2 | TEMPERATURE: 98.3 F | DIASTOLIC BLOOD PRESSURE: 95 MMHG | OXYGEN SATURATION: 95 %

## 2024-07-06 DIAGNOSIS — R03.0 ELEVATED BLOOD PRESSURE READING WITHOUT DIAGNOSIS OF HYPERTENSION: ICD-10-CM

## 2024-07-06 DIAGNOSIS — Z23 ENCOUNTER FOR IMMUNIZATION: ICD-10-CM

## 2024-07-06 DIAGNOSIS — S91.111A LACERATION OF RIGHT GREAT TOE WITHOUT FOREIGN BODY WITHOUT DAMAGE TO NAIL, INITIAL ENCOUNTER: Primary | ICD-10-CM

## 2024-07-06 RX ORDER — BUSPIRONE HYDROCHLORIDE 10 MG/1
10 TABLET ORAL 2 TIMES DAILY
COMMUNITY

## 2024-07-06 RX ORDER — DOXEPIN HYDROCHLORIDE 100 MG/1
100 CAPSULE ORAL NIGHTLY
COMMUNITY

## 2024-07-06 RX ORDER — TAMSULOSIN HYDROCHLORIDE 0.4 MG/1
0.4 CAPSULE ORAL NIGHTLY
COMMUNITY
Start: 2023-06-07

## 2024-07-06 NOTE — PATIENT INSTRUCTIONS
Laceration  Your wound was re approximated with skin adhesive:  Keep the cut dry for the first 24 to 48 hours. After this, you can shower if your doctor okays it. Pat the cut dry.  Don't soak the cut, such as in a bathtub. Your doctor will tell you when it's safe to get the cut wet.  If your doctor told you how to care for your cut, follow your doctor's instructions. If you did not get instructions, follow this general advice:  Do not put any kind of ointment, cream, or lotion over the area. This can make the adhesive fall off too soon.  After the first 24 to 48 hours, wash around the cut with clean water 2 times a day. Do not use hydrogen peroxide or alcohol, which can slow healing.  If the doctor told you to use a bandage, put on a new bandage after cleaning the cut or if the bandage gets wet or dirty.  Prop up the sore area on a pillow anytime you sit or lie down during the next 3 days. Try to keep it above the level of your heart. This will help reduce swelling.  Leave the skin adhesive on your skin until it falls off on its own. This may take 5 to 10 days.  Do not scratch, rub, or pick at the adhesive.  Do not put the sticky part of a bandage directly on the adhesive.  Avoid any activity that could cause your cut to reopen.  Be safe with medicines. Read and follow all instructions on the label.  If the doctor gave you a prescription medicine for pain, take it as prescribed.  If you are not taking a prescription pain medicine, ask your doctor if you can take an over-the-counter medicine.  Watch for signs of infection (such as swelling, increased tenderness, discharge, purulence, spreading red rash, heat), and return to the clinic should any develop.    Tetanus Vaccine  You received a booster immunization to protect against tetanus called Boostrix    Elevated blood Pressure  Your blood pressure was noted to be elevated in the clinic today.  Continue at-home monitoring of BP - recommend keeping a log of your blood

## 2024-07-06 NOTE — PROGRESS NOTES
Riccardo Waterman (: 1960) is a 64 y.o. male, New patient, here for evaluation of the following chief complaint(s):  Injury (Right Toe/Foot Injury - caught on his deck - less than an hour ago.  Bleeding has subsided.  Unsure of last tetanus date.   )      ASSESSMENT/PLAN:    ICD-10-CM    1. Laceration of right great toe without foreign body without damage to nail, initial encounter  S91.111A Tdap, BOOSTRIX, (age 10 yrs+), IM     LACERATION REPAIR     [employer] Post Op Shoe, Unisex - Right; LG (M9.5-12/F10.5-13)     MD LT DOREEN BAND >=3\" <5\"/YD     DISCONTINUED: Tetanus-Diphth-Acell Pertussis (BOOSTRIX) 5-2.5-18.5 LF-MCG/0.5 injection      2. Elevated blood pressure reading without diagnosis of hypertension  R03.0       3. Encounter for immunization  Z23         Laceration right great toe  Wound was re approximated with skin adhesive:  Instructed to keep the wound dry for the first 24 to 48 hours and to avoid soaking n water or swimming pools/hot tubs.  Instructed to care for wound:  Not to apply ointment, cream, or lotion over the area.   Told that after the first 24 to 48 hours to wash around the wound with clean water 2 times a day.   Instructed not to use hydrogen peroxide or alcohol, which could slow healing.  May cover with a new bandage after cleaning the cut or if the bandage gets wet or dirty.  Leave the skin adhesive on your skin until it falls off on its own. This may take 5 to 10 days.  Instructed not to scratch, rub, or pick at the adhesive.  Told to avoid any activity that could cause wound to reopen.  Instructed to watch for signs of infection (such as swelling, increased tenderness, discharge, purulence, spreading red rash, heat), and return to the clinic should any develop.    Elevated Blood Pressure  Pt's BP was noted to be elevated during initial vital signs assessment - repeated BP assessment, >5 minutes after the initial measurement, shows persistent BP

## (undated) DEVICE — ENDOSCOPIC KIT 2 12 FT OP4 DE2 GWN SYR

## (undated) DEVICE — ELECTRODE,ECG,STRESS,FOAM,3PK: Brand: MEDLINE

## (undated) DEVICE — FORCEPS BX L240CM JAW DIA2.8MM L CAP W/ NDL MIC MESH TOOTH

## (undated) DEVICE — FORMALIN  10%NBF 20ML PREFLL CONT

## (undated) DEVICE — CANNULA NSL AD TBNG L7FT PVC STR NONFLARED PRNG O2 DEL W STD